# Patient Record
Sex: FEMALE | Race: WHITE | NOT HISPANIC OR LATINO | Employment: OTHER | URBAN - METROPOLITAN AREA
[De-identification: names, ages, dates, MRNs, and addresses within clinical notes are randomized per-mention and may not be internally consistent; named-entity substitution may affect disease eponyms.]

---

## 2021-05-10 ENCOUNTER — TELEPHONE (OUTPATIENT)
Dept: OTHER | Facility: CLINIC | Age: 71
End: 2021-05-10

## 2021-05-10 NOTE — TELEPHONE ENCOUNTER
Patient was incorrectly scheduled to discuss her arterial concerns with a physician at our [facility].  I contacted patient to explain.  Patient had a left leg bypass, it occluded, she had a thrombectomy and the graft was noted to be occluded at discharge s/p thrombectomy.  Patient would like to see another vascular surgeon as she would like to know what her options are at this point.  Spoke with [facility] radiology department who asked that I fax over a cover sheet with my request to have angio images from 03/08/21 and 04/10/21 pushed to [facility].  I have also contacted [facility] to make sure they are aware images are being pushed.  Patient agreed to 05/12/21 appointment scheduled with Dr. Holly at our [facility] and the address.  I did speak with a vascular nurse before scheduling patient with Dr. Holly.  I will route for vascular nurse review and to verify that angio images are in the PACS system for the upcoming appointment.

## 2021-05-11 NOTE — TELEPHONE ENCOUNTER
Imaging in PACS, notes viewable in Care Everywhere.    Rabia Vo, BSN, RN-[facility] [place]

## 2021-05-12 ENCOUNTER — OFFICE VISIT (OUTPATIENT)
Dept: OTHER | Facility: CLINIC | Age: 71
End: 2021-05-12
Attending: SURGERY
Payer: MEDICARE

## 2021-05-12 VITALS — SYSTOLIC BLOOD PRESSURE: 126 MMHG | TEMPERATURE: 96.2 F | DIASTOLIC BLOOD PRESSURE: 77 MMHG | HEART RATE: 87 BPM

## 2021-05-12 DIAGNOSIS — T82.868A THROMBOSIS OF FEMORO-POPLITEAL BYPASS GRAFT, INITIAL ENCOUNTER (H): Primary | ICD-10-CM

## 2021-05-12 PROCEDURE — G0463 HOSPITAL OUTPT CLINIC VISIT: HCPCS

## 2021-05-12 PROCEDURE — 99204 OFFICE O/P NEW MOD 45 MIN: CPT | Performed by: SURGERY

## 2021-05-12 RX ORDER — LEVOCETIRIZINE DIHYDROCHLORIDE 5 MG/1
5 TABLET, FILM COATED ORAL
COMMUNITY
End: 2024-03-26

## 2021-05-12 RX ORDER — DOXYCYCLINE 100 MG/1
100 CAPSULE ORAL
COMMUNITY
Start: 2021-05-06 | End: 2021-05-13

## 2021-05-12 RX ORDER — SOLIFENACIN SUCCINATE 10 MG/1
10 TABLET, FILM COATED ORAL DAILY
COMMUNITY
Start: 2021-05-07

## 2021-05-12 RX ORDER — LEVOTHYROXINE SODIUM 25 UG/1
25 TABLET ORAL DAILY
COMMUNITY
Start: 2021-04-26

## 2021-05-12 RX ORDER — ACETAMINOPHEN 500 MG
500-1000 TABLET ORAL EVERY 6 HOURS PRN
COMMUNITY
Start: 2020-01-28

## 2021-05-12 RX ORDER — WARFARIN SODIUM 5 MG/1
5 TABLET ORAL
COMMUNITY
Start: 2021-04-16 | End: 2022-04-16

## 2021-05-12 RX ORDER — WARFARIN SODIUM 2.5 MG/1
1.25-5 TABLET ORAL
COMMUNITY
Start: 2021-04-23 | End: 2022-04-23

## 2021-05-12 RX ORDER — AMOXICILLIN AND CLAVULANATE POTASSIUM 500; 125 MG/1; MG/1
1 TABLET, FILM COATED ORAL
COMMUNITY
Start: 2021-05-06 | End: 2021-05-13

## 2021-05-12 SDOH — HEALTH STABILITY: MENTAL HEALTH: HOW OFTEN DO YOU HAVE 6 OR MORE DRINKS ON ONE OCCASION?: NOT ASKED

## 2021-05-12 SDOH — HEALTH STABILITY: MENTAL HEALTH: HOW MANY STANDARD DRINKS CONTAINING ALCOHOL DO YOU HAVE ON A TYPICAL DAY?: NOT ASKED

## 2021-05-12 SDOH — HEALTH STABILITY: MENTAL HEALTH: HOW OFTEN DO YOU HAVE A DRINK CONTAINING ALCOHOL?: NOT ASKED

## 2021-05-12 NOTE — PROGRESS NOTES
Cheryl Ramesh is a 71 year old female who presents for:  Chief Complaint   Patient presents with     RECHECK     New consult to discuss occluded Left fem-pop bypass from [facility].  Patient looking for 2nd opinion and was given Dr. Holly's name.  Requested angio images from 03/08/21 and 04/09/21 be pushed to PACS. Other reports, office visits and op notes in Care Everywhere        Vitals:    Vitals:    05/12/21 0930 05/12/21 0932   BP: 136/77 126/77   BP Location: Left arm Right arm   Patient Position: Chair Chair   Cuff Size: Adult Regular Adult Regular   Pulse: 88 87   Temp: 96.2  F (35.7  C)    TempSrc: Temporal        BMI:  There is no height or weight on file to calculate BMI.    Pain Score:  Data Unavailable        Adriana Damian MA

## 2021-05-13 ASSESSMENT — ENCOUNTER SYMPTOMS
POOR WOUND HEALING: 1
BRUISES/BLEEDS EASILY: 1
NEUROLOGICAL NEGATIVE: 1
CONSTITUTIONAL NEGATIVE: 1
RESPIRATORY NEGATIVE: 1
PSYCHIATRIC NEGATIVE: 1
EYES NEGATIVE: 1
GASTROINTESTINAL NEGATIVE: 1
ENDOCRINE NEGATIVE: 1

## 2021-05-13 NOTE — PROGRESS NOTES
[facility] VASCULAR [facility] CENTER INITIAL VASCULAR SURGERY CONSULT    Impression:   1.  Status post a mid left SFA to below-knee popliteal bypass with translocated, nonreversed greater saphenous vein performed at an outside institution on 3/31/2021 for claudication.  Status post thrombectomy of this left leg bypass with revision of the distal anastomosis using a vein patch angioplasty and vein patch angioplasty of the mid graft stenosis also performed at an outside institution on 4/8/2021.  She was placed on empiric Coumadin but unfortunately this graft is again occluded.  She has been treated for a soft tissue infection of the mid left thigh.  She still complains of significant thigh pain and difficulty ambulating.  Post left leg graft thrombosis YVON is now 0.31 compared to 0.50 preoperatively.  She may be suffering from borderline rest pain.    She does have a 4 x 2 x 7 cm loculated hyperdense fluid collection in the medial aspect of the distal left thigh.  This was felt to represent either postoperative hematoma versus abscess.  Attempts at ultrasound-guided aspiration on 5/7/2021 were unsuccessful.    2.  Longstanding history of tobacco abuse having just quit smoking on 3/31/2021.    3.  Dyslipidemia    Plan:   I had a lengthy discussion with Cheryl regarding all the above.  I reinforced to her that based on available records and imaging her prior left leg bypass was appropriate.  Unfortunately, graft thrombectomy and revision have subsequently failed despite empiric Coumadin.  She had a wound of the left thigh which has required packing although this wound has largely healed.  She previously was on Augmentin for presumed soft tissue infection.    She has reasonable left-sided pedal Doppler signals and her post procedure resting YVON is 0.31.  She may have borderline left leg rest pain.  She will ultimately need a redo left leg bypass.  This would require either harvesting of the contralateral greater  saphenous vein or utilization of a cadaveric graft.  Given the recent soft tissue infection and postop changes, delaying this bypass would be in her best interest.  I note that she had recent unsuccessful ultrasound-guided attempt at aspiration of a distal left thigh hematoma.  Ideally, if this fluid is indeed infected it should be definitively drained.    She should be able to discontinue the empiric warfarin, but I defer this decision to her vascular surgeon.   I stressed the importance of absolute tobacco cessation and congratulated her on the ability to quit smoking thus far.  I also note that her LDL is a bit elevated and recommended that she follow-up for a repeat lipid profile with her primary care physician in 3 months.  She will continue her follow-up with her vascular surgeon.  The residual fluid collection in the distal left thigh may still need to be definitively drained.  Follow-up with me will be on an as-needed basis.      HPI:   Cheryl Ramesh is a 71-year-old female with dyslipidemia and prior tobacco abuse who underwent a mid left SFA to below-knee popliteal bypass using translocated, nonreverse greater saphenous vein performed at an outside institution on 3/31/2021.  Preoperatively her left-sided YVON was 0.5 decreasing to 0.10 post exercise.  Her surgery was uneventful.  A Prevena wound VAC was placed on her left groin.  When she return for removal of this device 1 week later the graft was noted to be thrombosed.  She was taken back to surgery on 4/8/2021 for graft thrombectomy with vein patch angioplasty of the mid graft and revision of the distal anastomosis using a vein patch.  She was started on empiric Coumadin.  Unfortunately that graft has failed.  She presents to me today for a second opinion regarding her left leg.    She had an open, draining wound which was managed with local care.  She was placed on empiric Augmentin for cellulitic changes of the left thigh.  She complains of  persistent pain in her left thigh.  She has limited her ambulation since surgery due to this discomfort.  She is unable to ambulate more than 1 block.  She notes intermittent pain in her left foot at rest.  She is in capable of standing any significant time.  She is sleeping in a recliner with her left leg in a dependent position.  Post graft thrombosis left leg YVON is now 0.31 compared to 0.50 preoperatively.    She is a retired individual who still enjoys gardening, golf, and travel.  She performs her own housework.  She has a significant tobacco history and just quit smoking on 3/31/2021.      CURRENT MEDICATIONS  acetaminophen (TYLENOL) 500 MG tablet, Take 500-1,000 mg by mouth  amoxicillin-clavulanate (AUGMENTIN) 500-125 MG tablet, Take 1 tablet by mouth  aspirin (ASA) 81 MG EC tablet, Take 81 mg by mouth  doxycycline monohydrate (MONODOX) 100 MG capsule, Take 100 mg by mouth  levocetirizine (XYZAL) 5 MG tablet, Take 5 mg by mouth  levothyroxine (SYNTHROID/LEVOTHROID) 25 MCG tablet, Take 25 mcg by mouth  rosuvastatin (CRESTOR) 10 MG tablet,   solifenacin (VESICARE) 10 MG tablet, Take 10 mg by mouth  warfarin ANTICOAGULANT (COUMADIN) 2.5 MG tablet, Take 1.25-5 mg by mouth  warfarin ANTICOAGULANT (COUMADIN) 5 MG tablet, Take 5 mg by mouth    No current facility-administered medications on file prior to visit.         PAST MEDICAL HISTORY  History reviewed. No pertinent past medical history.      PAST SURGICAL HISTORY:  History reviewed. No pertinent surgical history.    ALLERGIES     Allergies   Allergen Reactions     Azithromycin Rash     Cheryl doesn't recall having a problem with Zithromax. She only recalls Sulfa as being an allergy.     Ciprofloxacin Rash     Sulfa Drugs Rash       FAMILY HISTORY  History reviewed. No pertinent family history.    SOCIAL HISTORY  Social History     Tobacco Use     Smoking status: Former Smoker     Quit date: 3/31/2021     Years since quittin.1     Smokeless tobacco: Never  Used   Substance Use Topics     Alcohol use: Not Currently     Drug use: Never       ROS:   Review of Systems   Constitution: Negative.   HENT: Negative.    Eyes: Negative.    Cardiovascular: Positive for leg swelling.        See HPI.   Respiratory: Negative.    Endocrine: Negative.    Hematologic/Lymphatic: Bruises/bleeds easily.   Skin: Positive for poor wound healing.   Musculoskeletal: Positive for joint pain.   Gastrointestinal: Negative.    Genitourinary: Negative.    Neurological: Negative.    Psychiatric/Behavioral: Negative.          EXAM:  /77 (BP Location: Right arm, Patient Position: Chair, Cuff Size: Adult Regular)   Pulse 87   Temp 96.2  F (35.7  C) (Temporal)   Breastfeeding No   Physical Exam  Vitals signs reviewed.   Constitutional:       Appearance: Normal appearance.   HENT:      Head: Normocephalic and atraumatic.   Eyes:      General: No scleral icterus.     Extraocular Movements: Extraocular movements intact.      Pupils: Pupils are equal, round, and reactive to light.   Cardiovascular:      Rate and Rhythm: Normal rate and regular rhythm.      Pulses:           Radial pulses are 2+ on the right side and 2+ on the left side.        Femoral pulses are 2+ on the right side and 2+ on the left side.       Dorsalis pedis pulses are detected w/ Doppler on the right side and detected w/ Doppler on the left side.        Posterior tibial pulses are 2+ on the right side and detected w/ Doppler on the left side.      Comments: Surgical incisions on the medial left thigh and proximal medial left calf with normal postoperative changes and induration.  No significant erythema.  No open draining wounds.  No wounds on left foot.  Skin:     General: Skin is warm and dry.   Neurological:      General: No focal deficit present.      Mental Status: She is alert and oriented to person, place, and time. Mental status is at baseline.   Psychiatric:         Mood and Affect: Mood normal.         Behavior:  Behavior normal.         Thought Content: Thought content normal.         Judgment: Judgment normal.       Labs:  LIPID RESULTS:  No results found for: CHOL, HDL, LDL, TRIG, CHOLHDLRATIO    CBC RESULTS:  No results found for: WBC, RBC, HGB, HCT, MCV, MCH, MCHC, RDW, PLT    BMP RESULTS:  No results found for: NA, POTASSIUM, CHLORIDE, CO2, ANIONGAP, GLC, BUN, CR, GFRESTIMATED, GFRESTBLACK, KRISTY     A1C RESULTS:  No results found for: A1C      Imaging:    Normal ankle/brachial and digit/brachial pressure indices within the right   lower extremity.    Severely reduced ankle/brachial and digit/brachial pressure indices within   the left lower extremity.   Result Narrative   Indication: Peripheral vascular disease; assess graft patency    Thrombectomy of left femoral to popliteal artery bypass with vein patch at   mid thigh and distal anastomosis: 04/08/2021    A Doppler examination with ankle/brachial and digit/brachial pressure   indices was performed on the bilateral lower extremities.    RIGHT LOWER EXTREMITY    There are biphasic Doppler waveforms present within the posterior tibial   and dorsalis pedis arteries.    The right ankle/brachial pressure index is normal at 1.04.    The right digit/brachial pressure index is normal at 0.62.    LEFT LOWER EXTREMITY    There are reduced monophasic Doppler waveforms present within the   posterior tibial and dorsalis pedis arteries.    The left ankle/brachial pressure index is severely reduced at 0.31.    The left digit/brachial pressure index is severely reduced at 0.13.         Total length of this encounter was 45 minutes.          Dontrell Holly MD

## 2022-03-16 ENCOUNTER — TELEPHONE (OUTPATIENT)
Dept: OTHER | Facility: CLINIC | Age: 72
End: 2022-03-16
Payer: MEDICARE

## 2022-03-16 DIAGNOSIS — T82.868A THROMBOSIS OF FEMORO-POPLITEAL BYPASS GRAFT, INITIAL ENCOUNTER (H): Primary | ICD-10-CM

## 2022-03-16 NOTE — TELEPHONE ENCOUNTER
Previous patient of Dr. Holly    Patient called given quickly deteriorating knee condition. Had two unsuccessful bypass procedures previously in lower left leg that she consulted with Dr. Avni jason.    Biggest current need is questions on wether or not a new surgery on knee would be successful.    Patient has recent imaging with [facility] vascular center:    [place] (unspecified type on lower left leg) 1/3/22    [place] is preferred visit location    Please call [phone] to leave messages    Stephen Worley I   [facility]  Vascular [facility]   [address] [place] [address] [address] [place] [id number]  [phone]

## 2022-03-18 NOTE — TELEPHONE ENCOUNTER
Scheduled for 3/29/22 and 4/5/22.     Future Appointments   Date Time Provider Department Center   3/29/2022  2:45 PM VUS2 [facility]   4/5/2022  1:00 PM Dontrell Holly MD [facility]         Gela Martínez    [facility]   [phone]

## 2022-03-18 NOTE — TELEPHONE ENCOUNTER
Returned call to patient.  She states she no longer wants to follow with [facility] for her vascular care and is requesting to see Dr. Holly again.    LOV 5/12/21 with Dr. Holly.    Informed patient she will need a repeat YVON and in clinic visit with Dr. Holly.  Pt stated she wanted to check with her insurance company re: coverage, however would like to proceed with scheduling.    Order entered.  Routing to scheduling to contact patient to coordinate.  Please schedule as a return patient with Dr. Holly for 40 minutes.  Appt note in order.    Rabia Vo, KEVANN, RN-[facility] [place]

## 2022-03-29 ENCOUNTER — HOSPITAL ENCOUNTER (OUTPATIENT)
Dept: ULTRASOUND IMAGING | Facility: CLINIC | Age: 72
Discharge: HOME | End: 2022-03-29
Attending: SURGERY | Admitting: SURGERY
Payer: MEDICARE

## 2022-03-29 DIAGNOSIS — T82.868A THROMBOSIS OF FEMORO-POPLITEAL BYPASS GRAFT, INITIAL ENCOUNTER (H): ICD-10-CM

## 2022-03-29 PROCEDURE — 93922 UPR/L XTREMITY ART 2 LEVELS: CPT

## 2022-04-05 ENCOUNTER — OFFICE VISIT (OUTPATIENT)
Dept: OTHER | Facility: CLINIC | Age: 72
End: 2022-04-05
Attending: SURGERY
Payer: MEDICARE

## 2022-04-05 VITALS — DIASTOLIC BLOOD PRESSURE: 81 MMHG | SYSTOLIC BLOOD PRESSURE: 137 MMHG | HEART RATE: 92 BPM

## 2022-04-05 DIAGNOSIS — Z72.0 TOBACCO ABUSE: ICD-10-CM

## 2022-04-05 DIAGNOSIS — T82.868A THROMBOSIS OF FEMORO-POPLITEAL BYPASS GRAFT, INITIAL ENCOUNTER (H): Primary | ICD-10-CM

## 2022-04-05 PROCEDURE — 99214 OFFICE O/P EST MOD 30 MIN: CPT | Performed by: SURGERY

## 2022-04-05 PROCEDURE — G0463 HOSPITAL OUTPT CLINIC VISIT: HCPCS

## 2022-04-05 RX ORDER — CEPHALEXIN 500 MG/1
CAPSULE ORAL
COMMUNITY
Start: 2022-03-13

## 2022-04-05 NOTE — PROGRESS NOTES
[facility]        Patient is here for a  follow up.      Pt is currently taking Aspirin and Statin.    /81 (BP Location: Left arm, Patient Position: Chair, Cuff Size: Adult Regular)   Pulse 92   Breastfeeding No     The provider has been notified that the patient has no concerns.     Questions patient would like addressed today are: N/A.    Refills are needed: N/A    Has homecare services and agency name:  Gisela Damian MA

## 2022-04-05 NOTE — PROGRESS NOTES
Cheryl Ramesh is a 72-year-old female with dyslipidemia and prior tobacco abuse who is status post a failed mid left SFA to below-knee popliteal bypass using translocated, nonreversed greater saphenous vein performed at an outside institution on 3/31/2021.  Subsequent attempts at graft revision and empiric Coumadin were also unsuccessful.  She presented to me on 5/12/2021 for a second opinion as to right leg revascularization options.  Please see my detailed consultation from that 5/12/2021 office visit.  She has subsequently been seen by vascular surgeons at [facility], [facility], and a different vascular surgeon at [facility].    She is once again returning to me for vascular follow-up after an YVON at rest on 3/29/2022.  She is currently undergoing evaluation for a left knee replacement surgery.  She still complains of intermittent tenderness on the medial aspect of her distal left thigh which corresponds with a prior loculated 4 x 2 x 7 cm fluid collection.  She denies associated fever or chills.  When I last saw her in May of last year she had successfully quit smoking.  She subsequently resumed smoking and admits to smoking 8-10 cigarettes/day.  She is also fairly debilitated by plantar fasciitis.  Her ambulation is far more limited by her chronic knee pain and her plantar fasciitis then by obvious left calf claudication.  She reports the ability to walk 3 minutes on her treadmill before having to stop secondary to left knee and foot pain.  She denies rest pain.  She denies nonhealing wounds on her left foot.    Exam:  Pleasant, overweight female alert and oriented x3.  Blood pressure 137/81 with a pulse of 92.  Vague fullness and induration on the medial distal left thigh at the site that corresponds with a previously documented loculated hyperdense fluid collection.  The overlying skin is intact and without erythema or warmth.  Palpable right DP/PT pulses.  Monophasic left DP/PT Doppler  signals.  Both feet are warm and pink.  No open wounds.    Imaging:  US YVON DOPPLER NO EXERCISE, 1-2 LEVELS, BILAT  3/29/2022 3:17 PM     CLINICAL HISTORY: Peripheral arterial disease.  history of failed left  lower extremity bypass; evaluate with TBIs; Thrombosis of  femoro-popliteal bypass graft, initial encounter (H)     COMPARISON: None.     YVON FINDINGS:      RIGHT(mmHg)  Brachial: 141  Ankle (PT): 126; Index 0.80  Ankle (DP): 128; Index 0.81  Digit: 60; Index 0.38     LEFT (mmHg)  Brachial: 158  Ankle (PT): 50; Index 0.32  Ankle (DP): 57; Index 0.36  Digit: 23; Index 1.15                                                                      IMPRESSION:  1. RIGHT LOWER EXTREMITY: YVON resting is mildly diminished at 0.81.  The distal pressure is 60 mmHg which suggests adequate wound healing  potential. There are brisk normal triphasic waveforms throughout the  right lower extremity arteries. Overall the features would be  compatible with mild ischemia at rest however are not suggestive of a  hemodynamically significant lesion.     2. LEFT LOWER EXTREMITY: YVON resting his severely critically  diminished at 0.36. The digital pressure was 23 mmHg which suggest  poor wound healing potential. Monophasic waveforms within the  popliteal, posterior tibial and dorsalis pedis arteries. Overall the  features are suggestive of severe to critical ischemia at rest and  would be compatible with the provided indication of a thrombosed  femoral-popliteal arterial bypass.     SERGIO GROVES MD      ASSESSMENT:  1.  1 year status post failed mid left SFA to below-knee popliteal bypass with translocated, nonreversed greater saphenous vein.  Status post attempted graft thrombectomy with revision of the distal anastomosis.  Both of these procedures were performed at an outside institution.  She remains a stable short distance claudicator with a left-sided YVON of 0.36 which is essentially unchanged compared to her preoperative  baseline.  She is far more limited by osteoarthritis of her left knee and plantar fasciitis then she is by claudication.  She does not have evidence of critical limb ischemia.    2.  Ongoing tobacco abuse.    RECOMMENDATION:  I again reviewed all the above with Cheryl.  I stressed the importance of absolute tobacco cessation.  She has inline flow via her left superficial femoral artery to the level of the distal thigh as well as a reasonable quality left profundofemoral artery.  I believe she has adequate perfusion to pursue a left knee replacement without any further arterial intervention.    I discussed the specifics of a third time attempt at left leg revascularization.  I would not pursue this for claudication alone nor would I be willing to pursue this if she continues to smoke.  Once she has successful knee replacement surgery and has recovered from plantar fasciitis, I strongly recommend participation in a supervised exercise program with complete tobacco cessation.    I will be happy to see her for future follow-up in 6-12 months for repeat YVON with exercise.  All of her questions were answered and she verbalizes full understanding to the above and complete agreement with this management plan.    Total length of this encounter was 30 minutes.    Richi Holly MD

## 2022-04-12 DIAGNOSIS — T82.868A THROMBOSIS OF FEMORO-POPLITEAL BYPASS GRAFT, INITIAL ENCOUNTER (H): Primary | ICD-10-CM

## 2023-04-19 ENCOUNTER — HOSPITAL ENCOUNTER (OUTPATIENT)
Dept: ULTRASOUND IMAGING | Facility: CLINIC | Age: 73
Discharge: HOME | End: 2023-04-19
Attending: SURGERY
Payer: COMMERCIAL

## 2023-04-19 ENCOUNTER — OFFICE VISIT (OUTPATIENT)
Dept: OTHER | Facility: CLINIC | Age: 73
End: 2023-04-19
Attending: SURGERY
Payer: COMMERCIAL

## 2023-04-19 VITALS — DIASTOLIC BLOOD PRESSURE: 84 MMHG | HEART RATE: 96 BPM | SYSTOLIC BLOOD PRESSURE: 153 MMHG

## 2023-04-19 DIAGNOSIS — Z72.0 TOBACCO ABUSE: ICD-10-CM

## 2023-04-19 DIAGNOSIS — I73.9 PERIPHERAL VASCULAR DISEASE WITH CLAUDICATION (H): Primary | ICD-10-CM

## 2023-04-19 DIAGNOSIS — T82.868A THROMBOSIS OF FEMORO-POPLITEAL BYPASS GRAFT, INITIAL ENCOUNTER (H): ICD-10-CM

## 2023-04-19 PROCEDURE — 99213 OFFICE O/P EST LOW 20 MIN: CPT | Performed by: SURGERY

## 2023-04-19 PROCEDURE — G0463 HOSPITAL OUTPT CLINIC VISIT: HCPCS | Mod: 25

## 2023-04-19 PROCEDURE — 93924 LWR XTR VASC STDY BILAT: CPT

## 2023-04-19 NOTE — PROGRESS NOTES
[facility]        Patient is here for a  follow up.     Pt is currently taking Aspirin and Statin.    BP (!) 153/84 (BP Location: Left arm, Patient Position: Chair, Cuff Size: Adult Regular)   Pulse 96     The provider has been notified that the patient has no concerns.     Questions patient would like addressed today are: N/A.    Refills are needed: N/A    Has homecare services and agency name:  Gisela Damian MA

## 2023-04-19 NOTE — PROGRESS NOTES
Cheryl Ramesh is a 73-year-old female with dyslipidemia and prior tobacco abuse who is status post a failed mid left SFA to below-knee popliteal bypass using translocated, nonreversed greater saphenous vein performed at an outside hospital in 2021.  Subsequent attempts at graft revision and empiric Coumadin were also unsuccessful.    Please see a consultation from her 5/12/2021 office visit regarding left leg revascularization options.  I saw her in follow-up last year for the same issues.  She remains fairly debilitated by osteoarthritis of her left knee and left foot plantar fasciitis.  She has not yet pursued left knee replacement.  She moved over the winter from a house to a condominium.  She cites the stress of moving as the primary reason that she has not pursued knee replacement.  Unfortunately she continues to smoke about 10 cigarettes/day.      Exam:  Pleasant, overweight female alert and oriented x3.  Blood pressure 153/84 with a pulse of 96.  1+ palpable right DP and PT pulses.  Monophasic left DP and PT pulses.  Bilateral feet are warm and pink.    Imaging:  IR YVON US YVON DOPPLER WITH EXERCISE BILATERAL   4/19/2023 1:24 PM      HISTORY: history of failed left femoral-popliteal bypass;  claudication; Thrombosis of femoro-popliteal bypass graft, initial  encounter (H)     COMPARISON: 3/29/2022     FINDINGS:  Right YVON:   DP: 1.00  PT: 0.98.     Left YVON:   DP: 0.42   PT: 0.50.     Right Digital brachial index: 0.68, 98 mmHg.  Left Digital Brachial index: 0.10, 14 mmHg     Waveforms: Multiphasic in the right distal tibial arteries. Monophasic  in the distal left tibial arteries.     Exercise: The patient walked on a treadmill for 5 minutes at 1.5 miles  per hour and at a 10% incline. The patient had posterior upper thigh  pain at 3 minutes 30 seconds into exercise.     Right exercise YVON: 0.80.  Left exercise YVON: 0.22                                                                      IMPRESSION:   1.  Moderate exercise induced right lower extremity arterial  insufficiency.  2. Resting YVON in the left lower extremity indicates moderate to  severe arterial insufficiency. Following exercise, there is severe  left lower extremity arterial insufficiency. Left digital brachial  index would indicate poor wound healing potential.     YVON CRITERIA:  >0.95 Normal  0.90 - 0.94 Mild  0.5 - 0.89 Moderate  0.2 - 0.49 Severe  <0.2 Critical     INEZ MODI MD       ASSESSMENT:  1.  2 years status post failed mid left SFA to below-knee popliteal bypass with translocated, nonreversed greater saphenous vein.  Status post graft thrombectomy with revision of the distal anastomosis.  Both of these procedures were performed at an outside institution.  She remains a stable short distance claudicator with a left-sided YVON of 0.5 which is essentially unchanged compared to her preoperative baseline.  She remains far more limited by her osteoarthritis of her left knee and plantar fasciitis of her left foot and she is by claudication.  Left-sided toe pressures do demonstrate poor wound healing potential.    2.  Ongoing tobacco abuse.    RECOMMENDATION:  I reviewed all the above with Cheryl.  I stressed the importance of absolute tobacco cessation.  She has inline flow via her left superficial femoral artery to the level of the distal thigh as well as reasonable quality left profundofemoral artery flow.  I believe she has adequate perfusion to pursue a left knee replacement without any further arterial intervention.    I discussed the specifics of a third time attempt at left leg revascularization.  I would not pursue this for claudication alone nor would I be willing to pursue this if she continues to smoke.  If she has successful knee replacement and has recovered from plantar fasciitis I strongly recommend participation in a supervised exercise program with complete tobacco cessation.    She will continue her medical regimen.  Vascular  surgical follow-up will be with me in 1 year for a repeat YVON with exercise.  She verbalizes full understanding to the above and agreement with this management plan.    Total length of this encounter was 20 minutes with time spent reviewing studies, interviewing and examining the patient, answering questions, and formulating a treatment plan.    Richi Holly MD

## 2023-06-30 ENCOUNTER — TELEPHONE (OUTPATIENT)
Dept: OTHER | Facility: CLINIC | Age: 73
End: 2023-06-30
Payer: COMMERCIAL

## 2023-06-30 NOTE — TELEPHONE ENCOUNTER
Left voicemail with instructions for patient to call back to schedule their appointment(s)    June 30, 2023 , 3:58 PM

## 2023-06-30 NOTE — TELEPHONE ENCOUNTER
Review of Care Everywhere notes patient was seen at an urgent care on 6/21/23.  Noted to have venous stasis dermatitis of both lower extremities.      This is not a diagnosis Dr. Holly sees.  However, please offer an appointment with any Vascular medicine provider at next available.  Dr. Spivey has an opening for a new patient on 7/3/23.    Appt note:  Self referred for leg swelling, and venous stasis dermatitis of bilateral lower extremities; follows with Dr. Holly for PAD and failed left leg bypass graft; notes in Epic/Care Everywhere.    Rabia Vo, KEVANN, RN-[facility] [place]

## 2023-06-30 NOTE — TELEPHONE ENCOUNTER
[facility]    Who is the name of the provider?:  Avni    What is the location you see this provider at/preferred location?: [place]  Person calling / Facility: Cheryl Domitila  Phone number:  [phone]  Nurse call back needed:  YES     Reason for call:  Avni patient called and described recent pronounced swelling, numbness, tingling, skin irritation / rash on both legs. Rash was treated, swelling and other concerns are persisting. Asking for help on imaging request from her PCP. Please call her back.    Pharmacy location:  n/a  Outside Imaging: n/a   Can we leave a detailed message on this number?  YES

## 2023-07-03 ENCOUNTER — OFFICE VISIT (OUTPATIENT)
Dept: OTHER | Facility: CLINIC | Age: 73
End: 2023-07-03
Attending: INTERNAL MEDICINE
Payer: COMMERCIAL

## 2023-07-03 VITALS
WEIGHT: 183.8 LBS | OXYGEN SATURATION: 95 % | BODY MASS INDEX: 31.38 KG/M2 | HEIGHT: 64 IN | HEART RATE: 93 BPM | SYSTOLIC BLOOD PRESSURE: 125 MMHG | DIASTOLIC BLOOD PRESSURE: 79 MMHG

## 2023-07-03 DIAGNOSIS — M79.89 LEG SWELLING: Primary | ICD-10-CM

## 2023-07-03 PROCEDURE — 99205 OFFICE O/P NEW HI 60 MIN: CPT | Performed by: INTERNAL MEDICINE

## 2023-07-03 PROCEDURE — G0463 HOSPITAL OUTPT CLINIC VISIT: HCPCS

## 2023-07-03 RX ORDER — MULTIPLE VITAMINS W/ MINERALS TAB 9MG-400MCG
1 TAB ORAL DAILY
COMMUNITY
End: 2024-03-26

## 2023-07-03 NOTE — PROGRESS NOTES
"Patient is here to discuss consult    /75 (BP Location: Right arm, Patient Position: Chair, Cuff Size: Adult Regular)   Pulse 96   Ht 5' 4\" (1.626 m)   Wt 183 lb 12.8 oz (83.4 kg)   SpO2 95%   BMI 31.55 kg/m      Questions patient would like addressed today are: N/A.    Refills are needed: No    Has homecare services and agency name:  Gisela CASSIDY    "

## 2023-07-03 NOTE — PROGRESS NOTES
INITIAL VASCULAR MEDICAL ASSESSMENT  REFERRAL SOURCE: self  REASON FOR CONSULT: venous insufficiency    HPI: Cheryl Ramesh is an obese (BMI 31.55) 73 year old female with dyslipidemia, prior tobacco abuse, as well as a h/o PAD for which she is status post a failed mid left SFA to below-knee popliteal bypass using translocated, nonreversed greater saphenous vein performed at an outside hospital in 2021.  Subsequent attempts at graft revision and empiric Coumadin were also unsuccessful. She is a short disstance claudicator without CLI sxs or tissue loss. She is orthopaedically limited by knee OA, as well as bilateral plantar fasciitis and has plans to eventually undergo a left TKA. She notes that since approximately 6/7/23 she has developed a bilateral LE rash which has since dissipated and BLE swelling which has not dissipated. She was seen in an UC, was told she had venous stasis dermatitis, and told to wear compression hosiery. She has had no imaging pertaining to this.     Review Of Systems  Skin: as above  Eyes: negative  Ears/Nose/Throat: negative  Respiratory: No shortness of breath, dyspnea on exertion, cough, or hemoptysis  Cardiovascular: negative  Gastrointestinal: negative  Genitourinary: negative  Musculoskeletal: as above  Neurologic: negative  Psychiatric: negative  Hematologic/Lymphatic/Immunologic: negative  Endocrine: negative      PAST MEDICAL HISTORY:                No past medical history on file.    PAST SURGICAL HISTORY:                  Past Surgical History:   Procedure Laterality Date     IR LOWER EXTREMITY ANGIOGRAM LEFT  4/9/2021     IR LOWER EXTREMITY ANGIOGRAM LEFT  3/8/2021       CURRENT MEDICATIONS:                  Current Outpatient Medications   Medication Sig Dispense Refill     acetaminophen (TYLENOL) 500 MG tablet Take 500-1,000 mg by mouth       aspirin (ASA) 81 MG EC tablet Take 81 mg by mouth       cephALEXin (KEFLEX) 500 MG capsule TAKE 1 CAPSULE BY MOUTH EVERY DAY        levocetirizine (XYZAL) 5 MG tablet Take 5 mg by mouth (Patient not taking: Reported on 2022)       levothyroxine (SYNTHROID/LEVOTHROID) 25 MCG tablet Take 25 mcg by mouth       rosuvastatin (CRESTOR) 10 MG tablet 20 mg        solifenacin (VESICARE) 10 MG tablet Take 10 mg by mouth         ALLERGIES:                  Allergies   Allergen Reactions     Azithromycin Rash     Cheryl doesn't recall having a problem with Zithromax. She only recalls Sulfa as being an allergy.     Ciprofloxacin Rash     Sulfa Antibiotics Rash       SOCIAL HISTORY:                  Social History     Socioeconomic History     Marital status: Single     Spouse name: Not on file     Number of children: Not on file     Years of education: Not on file     Highest education level: Not on file   Occupational History     Not on file   Tobacco Use     Smoking status: Former     Types: Cigarettes     Quit date: 3/31/2021     Years since quittin.2     Smokeless tobacco: Never   Substance and Sexual Activity     Alcohol use: Not Currently     Drug use: Never     Sexual activity: Not on file   Other Topics Concern     Not on file   Social History Narrative     Not on file     Social Determinants of Health     Financial Resource Strain: Not on file   Food Insecurity: Not on file   Transportation Needs: Not on file   Physical Activity: Not on file   Stress: Not on file   Social Connections: Not on file   Intimate Partner Violence: Not on file   Housing Stability: Not on file       FAMILY HISTORY:                 No family history on file.      Physical exam Reveals:    O/P: WNL  HEENT: WNL  NECK: No JVD, thyromegaly, or lymphadenopathy  HEART: RRR, no murmurs, gallops, or rubs  LUNGS: CTA bilaterally without rales, wheezes, or rhonchi  GI: NABS, nondistended, nontender, soft  EXT:without cyanosis, clubbing; 2 plus bilateral symmetrical LE edema, CEAP C3 venous insufficiency bialterally; Stemmer sign negative bialterally.  NEURO: nonfocal  : no  flank tenderness                IR YVON US YVON DOPPLER WITH EXERCISE BILATERAL   4/19/2023 1:24 PM      HISTORY: history of failed left femoral-popliteal bypass;  claudication; Thrombosis of femoro-popliteal bypass graft, initial  encounter (H)     COMPARISON: 3/29/2022     FINDINGS:  Right YVON:   DP: 1.00  PT: 0.98.     Left YVON:   DP: 0.42   PT: 0.50.     Right Digital brachial index: 0.68, 98 mmHg.  Left Digital Brachial index: 0.10, 14 mmHg     Waveforms: Multiphasic in the right distal tibial arteries. Monophasic  in the distal left tibial arteries.     Exercise: The patient walked on a treadmill for 5 minutes at 1.5 miles  per hour and at a 10% incline. The patient had posterior upper thigh  pain at 3 minutes 30 seconds into exercise.     Right exercise YVON: 0.80.  Left exercise YVON: 0.22                                                                      IMPRESSION:   1. Moderate exercise induced right lower extremity arterial  insufficiency.  2. Resting YVON in the left lower extremity indicates moderate to  severe arterial insufficiency. Following exercise, there is severe  left lower extremity arterial insufficiency. Left digital brachial  index would indicate poor wound healing potential.     YVON CRITERIA:  >0.95 Normal  0.90 - 0.94 Mild  0.5 - 0.89 Moderate  0.2 - 0.49 Severe  <0.2 Critical     INEZ MODI MD          A/P:      (M79.89) Leg swelling  (primary encounter diagnosis)  Comment: Her rash has improved . Her edema has not. She has findings suggestive of venous insufficiency bilaterally to the above knee location. While we can not for certain that she does not have a DVT without imaging, the fact that her rash has dissipated rather than gotten worse, and the fact that he swelling is symmetrical and not progressive is reassuring for the absence of a DVT. She is told to call us for progressive swelling sxs in which case I would order a venus comp study. She is given an Rx for thigh high and  waist high 20-30 mm Hg compression hosiery. Her bypass graft has already failed so I am unconcerned regarding her ability to tolerate compression from n arterial perspective. I advised her of the need to control edema prior to Lt TKA. She will wear compression. RTC 3 to 6 months S/P wearing compression if dissatisfied with sx control in which case we will need to undertake a venous comp study.  Plan: Compression Sleeve/Stocking Order for DME -                 62 minutes total medical care on today's date.

## 2023-07-13 ENCOUNTER — TELEPHONE (OUTPATIENT)
Dept: OTHER | Facility: CLINIC | Age: 73
End: 2023-07-13
Payer: COMMERCIAL

## 2023-07-13 NOTE — TELEPHONE ENCOUNTER
[facility]    Who is the name of the provider?:  Dr Spivey  What is the location you see this provider at/preferred location?: [place]  Person calling / Facility: Cheryl  Phone number:  [phone]  Nurse call back needed:  Yes please , if questions or when done please    Reason for call:  Cheryl called today to have compression socks prescription sent to insurance company then they will pay for them or help cover      Pharmacy location:  chart  Outside Imaging: no   Can we leave a detailed message on this number?  yes

## 2023-07-13 NOTE — TELEPHONE ENCOUNTER
"Called Cheryl and asked for a fax number to which she would like her prescription sent.    She was unable to provide one.  She is going to call [employer] and request that they submit for reimbursement.  Apparently they declined to do so before because \"they are never covered\".  The patient states that her insurance company gave her some codes and told her they are covered.    She will call back if she needs additional assistance.  "

## 2024-01-17 ENCOUNTER — TELEPHONE (OUTPATIENT)
Dept: OTHER | Facility: CLINIC | Age: 74
End: 2024-01-17
Payer: COMMERCIAL

## 2024-01-17 NOTE — TELEPHONE ENCOUNTER
[facility]    Who is the name of the provider?:  DONTRELL HOLLY   What is the location you see this provider at/preferred location?: [place]  Person calling / Facility: Cheryl Ramesh  Phone number:  [phone]   Nurse call back needed:  ?     Reason for call:  Patient describes increasing pain, numbness and tingling in LLE. States that pain wakes her from sleep. Patient meant to be seen in April, scheduled her next week. FYI new symptoms in case any additional imaging needs to be added.    Future Appointments   Date Time Provider Department Center   1/24/2024  1:30 PM [facility]   1/24/2024  2:20 PM Dontrell Holly MD [facility]     Pharmacy location:     [facility] [id number] [place] [facility] - [address] [facility] [facility] [place], [place] - [address] [place] [address]  Outside Imaging: n/a   Can we leave a detailed message on this number?  YES     1/17/2024, 2:21 PM

## 2024-01-17 NOTE — TELEPHONE ENCOUNTER
Pt has history of failed mid left SFA to BK popliteal bypass with NRTSV; s/p revision of distal anastomosis with thrombectomy.  Both procedures done at outside hospital.    Duration? Feels like it has been increasing the last few months, however last week, pt wakes up at night with pain around left ankle; puts pillow under knee which seems to help; pt also notes it is painful to put the leg down and stand on the left leg, however once she starts walking it does start to feel better.  Temp? Equal  Color? Left ankle purple but feels this has gradually become worse over the last year.  Knee replacement? No, having shoulder replacement instead; this is not yet scheduled  Smoking?  Down to half pack  Wounds? Denies    Advised patient if her symptoms worsen over the next week to call us back.  Patient noted understanding.  Reviewed appointment date and times with patient and location for checkin.    Rabia Vo, KEVANN, RN, [facility] [place]

## 2024-01-24 ENCOUNTER — OFFICE VISIT (OUTPATIENT)
Dept: OTHER | Facility: CLINIC | Age: 74
End: 2024-01-24
Attending: SURGERY
Payer: COMMERCIAL

## 2024-01-24 ENCOUNTER — HOSPITAL ENCOUNTER (OUTPATIENT)
Dept: ULTRASOUND IMAGING | Facility: CLINIC | Age: 74
Discharge: HOME | End: 2024-01-24
Attending: SURGERY
Payer: COMMERCIAL

## 2024-01-24 VITALS — HEART RATE: 96 BPM | SYSTOLIC BLOOD PRESSURE: 134 MMHG | DIASTOLIC BLOOD PRESSURE: 83 MMHG

## 2024-01-24 DIAGNOSIS — Z72.0 TOBACCO ABUSE: ICD-10-CM

## 2024-01-24 DIAGNOSIS — I73.9 PERIPHERAL VASCULAR DISEASE WITH CLAUDICATION (H): ICD-10-CM

## 2024-01-24 DIAGNOSIS — I73.9 PERIPHERAL VASCULAR DISEASE WITH CLAUDICATION (H): Primary | ICD-10-CM

## 2024-01-24 PROCEDURE — G0463 HOSPITAL OUTPT CLINIC VISIT: HCPCS | Mod: 25 | Performed by: SURGERY

## 2024-01-24 PROCEDURE — 99213 OFFICE O/P EST LOW 20 MIN: CPT | Performed by: SURGERY

## 2024-01-24 PROCEDURE — 93924 LWR XTR VASC STDY BILAT: CPT

## 2024-01-24 RX ORDER — MULTIVITAMIN
1 TABLET ORAL DAILY
COMMUNITY

## 2024-01-24 RX ORDER — BUPROPION HYDROCHLORIDE 75 MG/1
75 TABLET ORAL EVERY MORNING
COMMUNITY
Start: 2023-12-29

## 2024-01-24 NOTE — PROGRESS NOTES
Cheryl Ramesh is a 73-year-old female with dyslipidemia and ongoing tobacco abuse who is status post a failed mid left SFA to below-knee popliteal bypass using translocated, nonreversed greater saphenous vein performed at an outside hospital in 2021.  Subsequent attempts at graft revision and empiric Coumadin were also unsuccessful.    She remains fairly debilitated by osteoarthritis of her left knee and left foot plantar fasciitis.  She still has not pursued left knee replacement but her left foot fasciitis has resolved.  She continues to cite stress is the reason for ongoing smoking.  She also complains of recent unilateral left calf and ankle edema.  Earlier this year she developed a left leg rash after a stressful time in her life.  Shortly thereafter she developed some left calf and ankle edema which has persisted for the past few months.    Exam:  Pleasant, overweight female alert and oriented x 3.  Blood pressure 134/83 with a pulse of 96.  1+ palpable right DP and PT pulses.  Monophasic left DP and PT pulses.  Both feet are warm and pink.  1+ pitting ankle edema bilaterally.  Her left calf appears slightly larger than the right.  Negative Homans' sign.    Imaging:  I reviewed today's YVON with exercise.  Her right leg is normal and decreases to 0.8 postexercise.  Resting left-sided YVON is 0.5 decreasing to 0.22 postexercise.  Her left leg response is unchanged compared to 1 year ago.    IMPRESSION:  1.  Left leg peripheral arterial disease with claudication status post failed left leg revisions x 2.  No critical limb ischemia.    2.  Unilateral left calf and ankle edema now for the past several months.  Clinically I am not concerned about DVT.  This may be related to the osteoarthritic changes of her left knee.    3.  Ongoing tobacco abuse.    PLAN:  I reviewed all the above with Cheryl.  I stressed the importance of absolute tobacco cessation both for her peripheral arterial disease as well as upcoming  orthopedic procedures.  She understands that I will not consider her for a third time left leg revascularization for claudication alone particularly if she continues to smoke.  She could consider a left leg compression stocking of 20-30 mmHg pressure for left calf edema control.  She already owns stockings but has a hard time putting them on due to her weak left shoulder.  She will continue her medical regimen which includes daily aspirin.  Vascular surgical follow-up will be with me in 1 year for a repeat YVON with exercise.    Total length of this encounter was 20 minutes with time spent reviewing studies, interviewing and examining the patient, answering questions, and formulating a treatment plan.

## 2024-01-24 NOTE — PROGRESS NOTES
[facility]        Patient is here for a  follow up  to discuss Peripheral artery disease (PAD).     Pt is currently taking Aspirin, Statin, and Antibiotics.    /83 (BP Location: Left arm, Patient Position: Chair, Cuff Size: Adult Regular)   Pulse 96     The provider has been notified that the patient has no concerns.     Questions patient would like addressed today are: N/A.    Refills are needed: N/A    Has homecare services and agency name:  Gisela Damian MA

## 2024-02-26 ENCOUNTER — MEDICAL CORRESPONDENCE (OUTPATIENT)
Dept: HEALTH INFORMATION MANAGEMENT | Facility: CLINIC | Age: 74
End: 2024-02-26

## 2024-03-15 ENCOUNTER — TRANSFERRED RECORDS (OUTPATIENT)
Dept: MULTI SPECIALTY CLINIC | Facility: CLINIC | Age: 74
End: 2024-03-15

## 2024-03-15 LAB
CREATININE (EXTERNAL): 0.75 MG/DL (ref 0.57–1)
GFR ESTIMATED (EXTERNAL): 83 ML/MIN/1.73
GLUCOSE (EXTERNAL): 96 MG/DL (ref 70–99)
POTASSIUM (EXTERNAL): 4.8 MMOL/L (ref 3.5–5.2)

## 2024-03-26 RX ORDER — AMMONIUM LACTATE 12 G/100G
CREAM TOPICAL DAILY PRN
COMMUNITY

## 2024-03-26 NOTE — PROGRESS NOTES
PTA medications updated by Medication Scribe prior to surgery via phone call with patient (last doses completed by Nurse)     Medication history sources: Patient, Surescripts, and H&P  In the past week, patient estimated taking medication this percent of the time: Greater than 90%      Significant changes made to the medication list:  Patient reports no longer taking the following meds (med scribe removed from PTA med list): Xyzal      Additional medication history information:   None    Medication reconciliation completed by provider prior to medication history? No    Time spent in this activity: 35 minutes    The information provided in this note is only as accurate as the sources available at the time of update(s)      Prior to Admission medications    Medication Sig Last Dose Taking? Auth Provider Long Term End Date   acetaminophen (TYLENOL) 500 MG tablet Take 500-1,000 mg by mouth every 6 hours as needed for pain Unknown at prn Yes Reported, Patient     ammonium lactate (AMLACTIN) 12 % external cream Apply topically daily as needed for dry skin Unknown at prn Yes Reported, Patient     aspirin (ASA) 81 MG EC tablet Take 81 mg by mouth 3/20/2024 at am Yes Reported, Patient     buPROPion (WELLBUTRIN) 75 MG tablet Take 75 mg by mouth every morning  at am Yes Reported, Patient Yes    Calcium Carb-Cholecalciferol (CALCIUM 500+D PO) Take 1 tablet by mouth daily 3/26/2024 at PM Yes Reported, Patient     cephALEXin (KEFLEX) 500 MG capsule TAKE 1 CAPSULE BY MOUTH EVERY DAY 3/26/2024 at am Yes Reported, Patient     levothyroxine (SYNTHROID/LEVOTHROID) 25 MCG tablet Take 25 mcg by mouth daily  at am Yes Reported, Patient Yes    Loratadine (CLARITIN PO) Take 10 mg by mouth daily  at am Yes Reported, Patient     multivitamin w/minerals (MULTI-VITAMIN) tablet Take 1 tablet by mouth daily 3/26/2024 at am Yes Reported, Patient     Rosuvastatin Calcium 40 MG CPSP Take 40 mg by mouth daily 3/26/2024 at am Yes Reported, Patient  Yes    solifenacin (VESICARE) 10 MG tablet Take 10 mg by mouth daily 3/26/2024 at am Yes Reported, Patient         Medication history completed by: Shagufta Sotomayor LPN

## 2024-03-27 ENCOUNTER — ANESTHESIA (OUTPATIENT)
Dept: SURGERY | Facility: CLINIC | Age: 74
End: 2024-03-27
Payer: COMMERCIAL

## 2024-03-27 ENCOUNTER — HOSPITAL ENCOUNTER (OUTPATIENT)
Facility: CLINIC | Age: 74
Discharge: HOME | End: 2024-03-28
Attending: ORTHOPAEDIC SURGERY | Admitting: ORTHOPAEDIC SURGERY
Payer: COMMERCIAL

## 2024-03-27 ENCOUNTER — APPOINTMENT (OUTPATIENT)
Dept: GENERAL RADIOLOGY | Facility: CLINIC | Age: 74
End: 2024-03-27
Attending: ORTHOPAEDIC SURGERY
Payer: COMMERCIAL

## 2024-03-27 ENCOUNTER — ANESTHESIA EVENT (OUTPATIENT)
Dept: SURGERY | Facility: CLINIC | Age: 74
End: 2024-03-27
Payer: COMMERCIAL

## 2024-03-27 DIAGNOSIS — M75.102 LEFT ROTATOR CUFF TEAR ARTHROPATHY: Primary | ICD-10-CM

## 2024-03-27 DIAGNOSIS — M12.812 LEFT ROTATOR CUFF TEAR ARTHROPATHY: Primary | ICD-10-CM

## 2024-03-27 PROCEDURE — 370N000017 HC ANESTHESIA TECHNICAL FEE, PER MIN: Performed by: ORTHOPAEDIC SURGERY

## 2024-03-27 PROCEDURE — 250N000025 HC SEVOFLURANE, PER MIN: Performed by: ORTHOPAEDIC SURGERY

## 2024-03-27 PROCEDURE — 258N000003 HC RX IP 258 OP 636: Performed by: ANESTHESIOLOGY

## 2024-03-27 PROCEDURE — 360N000077 HC SURGERY LEVEL 4, PER MIN: Performed by: ORTHOPAEDIC SURGERY

## 2024-03-27 PROCEDURE — 23472 RECONSTRUCT SHOULDER JOINT: CPT | Performed by: STUDENT IN AN ORGANIZED HEALTH CARE EDUCATION/TRAINING PROGRAM

## 2024-03-27 PROCEDURE — 258N000003 HC RX IP 258 OP 636: Performed by: NURSE ANESTHETIST, CERTIFIED REGISTERED

## 2024-03-27 PROCEDURE — 99100 ANES PT EXTEME AGE<1 YR&>70: CPT | Performed by: NURSE ANESTHETIST, CERTIFIED REGISTERED

## 2024-03-27 PROCEDURE — 23472 RECONSTRUCT SHOULDER JOINT: CPT | Performed by: NURSE ANESTHETIST, CERTIFIED REGISTERED

## 2024-03-27 PROCEDURE — 250N000011 HC RX IP 250 OP 636: Performed by: STUDENT IN AN ORGANIZED HEALTH CARE EDUCATION/TRAINING PROGRAM

## 2024-03-27 PROCEDURE — 272N000001 HC OR GENERAL SUPPLY STERILE: Performed by: ORTHOPAEDIC SURGERY

## 2024-03-27 PROCEDURE — 710N000009 HC RECOVERY PHASE 1, LEVEL 1, PER MIN: Performed by: ORTHOPAEDIC SURGERY

## 2024-03-27 PROCEDURE — C1776 JOINT DEVICE (IMPLANTABLE): HCPCS | Performed by: ORTHOPAEDIC SURGERY

## 2024-03-27 PROCEDURE — 258N000003 HC RX IP 258 OP 636: Performed by: ORTHOPAEDIC SURGERY

## 2024-03-27 PROCEDURE — 250N000009 HC RX 250: Performed by: PHYSICIAN ASSISTANT

## 2024-03-27 PROCEDURE — 999N000065 XR SHOULDER LEFT PORT G/E 2 VIEWS: Mod: LT

## 2024-03-27 PROCEDURE — 271N000001 HC OR GENERAL SUPPLY NON-STERILE: Performed by: ORTHOPAEDIC SURGERY

## 2024-03-27 PROCEDURE — C1713 ANCHOR/SCREW BN/BN,TIS/BN: HCPCS | Performed by: ORTHOPAEDIC SURGERY

## 2024-03-27 PROCEDURE — 250N000011 HC RX IP 250 OP 636: Performed by: NURSE ANESTHETIST, CERTIFIED REGISTERED

## 2024-03-27 PROCEDURE — 258N000001 HC RX 258: Performed by: ORTHOPAEDIC SURGERY

## 2024-03-27 PROCEDURE — 250N000011 HC RX IP 250 OP 636: Performed by: ORTHOPAEDIC SURGERY

## 2024-03-27 PROCEDURE — 250N000009 HC RX 250: Performed by: NURSE ANESTHETIST, CERTIFIED REGISTERED

## 2024-03-27 PROCEDURE — L3670 SO ACRO/CLAV CAN WEB PRE OTS: HCPCS

## 2024-03-27 PROCEDURE — 250N000011 HC RX IP 250 OP 636: Performed by: PHYSICIAN ASSISTANT

## 2024-03-27 PROCEDURE — 250N000013 HC RX MED GY IP 250 OP 250 PS 637: Performed by: ORTHOPAEDIC SURGERY

## 2024-03-27 PROCEDURE — 999N000141 HC STATISTIC PRE-PROCEDURE NURSING ASSESSMENT: Performed by: ORTHOPAEDIC SURGERY

## 2024-03-27 DEVICE — IMPLANTABLE DEVICE
Type: IMPLANTABLE DEVICE | Site: SHOULDER | Status: FUNCTIONAL
Brand: TORNIER FLEX SHOULDER SYSTEM

## 2024-03-27 DEVICE — IMPLANTABLE DEVICE
Type: IMPLANTABLE DEVICE | Site: SHOULDER | Status: FUNCTIONAL
Brand: AEQUALIS REVERSED II

## 2024-03-27 DEVICE — IMP SCR LOCKING 4.5X29MM AQLS DWD029: Type: IMPLANTABLE DEVICE | Site: SHOULDER | Status: FUNCTIONAL

## 2024-03-27 RX ORDER — HYDROMORPHONE HCL IN WATER/PF 6 MG/30 ML
0.4 PATIENT CONTROLLED ANALGESIA SYRINGE INTRAVENOUS
Status: DISCONTINUED | OUTPATIENT
Start: 2024-03-27 | End: 2024-03-28 | Stop reason: HOSPADM

## 2024-03-27 RX ORDER — LORATADINE 10 MG/1
10 TABLET ORAL DAILY
Status: DISCONTINUED | OUTPATIENT
Start: 2024-03-28 | End: 2024-03-28 | Stop reason: HOSPADM

## 2024-03-27 RX ORDER — HYDROMORPHONE HCL IN WATER/PF 6 MG/30 ML
0.4 PATIENT CONTROLLED ANALGESIA SYRINGE INTRAVENOUS EVERY 5 MIN PRN
Status: DISCONTINUED | OUTPATIENT
Start: 2024-03-27 | End: 2024-03-27 | Stop reason: HOSPADM

## 2024-03-27 RX ORDER — PROPOFOL 10 MG/ML
INJECTION, EMULSION INTRAVENOUS PRN
Status: DISCONTINUED | OUTPATIENT
Start: 2024-03-27 | End: 2024-03-27

## 2024-03-27 RX ORDER — ONDANSETRON 2 MG/ML
INJECTION INTRAMUSCULAR; INTRAVENOUS PRN
Status: DISCONTINUED | OUTPATIENT
Start: 2024-03-27 | End: 2024-03-27

## 2024-03-27 RX ORDER — KETAMINE HYDROCHLORIDE 10 MG/ML
INJECTION INTRAMUSCULAR; INTRAVENOUS PRN
Status: DISCONTINUED | OUTPATIENT
Start: 2024-03-27 | End: 2024-03-27

## 2024-03-27 RX ORDER — MULTIPLE VITAMINS W/ MINERALS TAB 9MG-400MCG
1 TAB ORAL DAILY
Status: DISCONTINUED | OUTPATIENT
Start: 2024-03-27 | End: 2024-03-27

## 2024-03-27 RX ORDER — CEFAZOLIN SODIUM/WATER 2 G/20 ML
2 SYRINGE (ML) INTRAVENOUS
Status: COMPLETED | OUTPATIENT
Start: 2024-03-27 | End: 2024-03-27

## 2024-03-27 RX ORDER — HYDROMORPHONE HCL IN WATER/PF 6 MG/30 ML
0.2 PATIENT CONTROLLED ANALGESIA SYRINGE INTRAVENOUS EVERY 5 MIN PRN
Status: DISCONTINUED | OUTPATIENT
Start: 2024-03-27 | End: 2024-03-27 | Stop reason: HOSPADM

## 2024-03-27 RX ORDER — PROPOFOL 10 MG/ML
INJECTION, EMULSION INTRAVENOUS CONTINUOUS PRN
Status: DISCONTINUED | OUTPATIENT
Start: 2024-03-27 | End: 2024-03-27

## 2024-03-27 RX ORDER — ONDANSETRON 2 MG/ML
4 INJECTION INTRAMUSCULAR; INTRAVENOUS EVERY 30 MIN PRN
Status: DISCONTINUED | OUTPATIENT
Start: 2024-03-27 | End: 2024-03-27 | Stop reason: HOSPADM

## 2024-03-27 RX ORDER — HYDROMORPHONE HCL IN WATER/PF 6 MG/30 ML
0.2 PATIENT CONTROLLED ANALGESIA SYRINGE INTRAVENOUS
Status: DISCONTINUED | OUTPATIENT
Start: 2024-03-27 | End: 2024-03-28 | Stop reason: HOSPADM

## 2024-03-27 RX ORDER — OXYCODONE HYDROCHLORIDE 5 MG/1
10 TABLET ORAL
Status: DISCONTINUED | OUTPATIENT
Start: 2024-03-27 | End: 2024-03-28 | Stop reason: HOSPADM

## 2024-03-27 RX ORDER — HYDROXYZINE HYDROCHLORIDE 10 MG/1
10 TABLET, FILM COATED ORAL EVERY 6 HOURS PRN
Qty: 30 TABLET | Refills: 0 | Status: SHIPPED | OUTPATIENT
Start: 2024-03-27 | End: 2024-03-28

## 2024-03-27 RX ORDER — OXYCODONE HYDROCHLORIDE 5 MG/1
5-10 TABLET ORAL EVERY 4 HOURS PRN
Qty: 30 TABLET | Refills: 0 | Status: SHIPPED | OUTPATIENT
Start: 2024-03-27

## 2024-03-27 RX ORDER — SODIUM CHLORIDE, SODIUM LACTATE, POTASSIUM CHLORIDE, CALCIUM CHLORIDE 600; 310; 30; 20 MG/100ML; MG/100ML; MG/100ML; MG/100ML
INJECTION, SOLUTION INTRAVENOUS CONTINUOUS
Status: DISCONTINUED | OUTPATIENT
Start: 2024-03-27 | End: 2024-03-27 | Stop reason: HOSPADM

## 2024-03-27 RX ORDER — HYDROMORPHONE HYDROCHLORIDE 1 MG/ML
INJECTION, SOLUTION INTRAMUSCULAR; INTRAVENOUS; SUBCUTANEOUS PRN
Status: DISCONTINUED | OUTPATIENT
Start: 2024-03-27 | End: 2024-03-27

## 2024-03-27 RX ORDER — TOLTERODINE 4 MG/1
4 CAPSULE, EXTENDED RELEASE ORAL DAILY
Status: DISCONTINUED | OUTPATIENT
Start: 2024-03-28 | End: 2024-03-28 | Stop reason: HOSPADM

## 2024-03-27 RX ORDER — ASPIRIN 81 MG/1
81 TABLET ORAL DAILY
Status: DISCONTINUED | OUTPATIENT
Start: 2024-03-27 | End: 2024-03-28 | Stop reason: HOSPADM

## 2024-03-27 RX ORDER — BUPROPION HYDROCHLORIDE 75 MG/1
75 TABLET ORAL EVERY MORNING
Status: DISCONTINUED | OUTPATIENT
Start: 2024-03-28 | End: 2024-03-28 | Stop reason: HOSPADM

## 2024-03-27 RX ORDER — HYDROXYZINE HYDROCHLORIDE 10 MG/1
10 TABLET, FILM COATED ORAL EVERY 6 HOURS PRN
Status: DISCONTINUED | OUTPATIENT
Start: 2024-03-27 | End: 2024-03-28 | Stop reason: HOSPADM

## 2024-03-27 RX ORDER — PROCHLORPERAZINE MALEATE 5 MG
5 TABLET ORAL EVERY 6 HOURS PRN
Status: DISCONTINUED | OUTPATIENT
Start: 2024-03-27 | End: 2024-03-28 | Stop reason: HOSPADM

## 2024-03-27 RX ORDER — LIDOCAINE HYDROCHLORIDE 20 MG/ML
INJECTION, SOLUTION INFILTRATION; PERINEURAL PRN
Status: DISCONTINUED | OUTPATIENT
Start: 2024-03-27 | End: 2024-03-27

## 2024-03-27 RX ORDER — OXYCODONE HYDROCHLORIDE 5 MG/1
5 TABLET ORAL
Status: DISCONTINUED | OUTPATIENT
Start: 2024-03-27 | End: 2024-03-28 | Stop reason: HOSPADM

## 2024-03-27 RX ORDER — ACETAMINOPHEN 500 MG
500-1000 TABLET ORAL EVERY 6 HOURS PRN
Status: DISCONTINUED | OUTPATIENT
Start: 2024-03-27 | End: 2024-03-27

## 2024-03-27 RX ORDER — ROSUVASTATIN CALCIUM 20 MG/1
40 TABLET, COATED ORAL DAILY
Status: DISCONTINUED | OUTPATIENT
Start: 2024-03-28 | End: 2024-03-28 | Stop reason: HOSPADM

## 2024-03-27 RX ORDER — ONDANSETRON 2 MG/ML
4 INJECTION INTRAMUSCULAR; INTRAVENOUS EVERY 6 HOURS PRN
Status: DISCONTINUED | OUTPATIENT
Start: 2024-03-27 | End: 2024-03-28 | Stop reason: HOSPADM

## 2024-03-27 RX ORDER — DIPHENHYDRAMINE HCL 12.5MG/5ML
12.5 LIQUID (ML) ORAL EVERY 6 HOURS PRN
Status: DISCONTINUED | OUTPATIENT
Start: 2024-03-27 | End: 2024-03-28 | Stop reason: HOSPADM

## 2024-03-27 RX ORDER — TRANEXAMIC ACID 10 MG/ML
1 INJECTION, SOLUTION INTRAVENOUS ONCE
Status: COMPLETED | OUTPATIENT
Start: 2024-03-27 | End: 2024-03-27

## 2024-03-27 RX ORDER — LEVOTHYROXINE SODIUM 25 UG/1
25 TABLET ORAL
Status: DISCONTINUED | OUTPATIENT
Start: 2024-03-28 | End: 2024-03-28 | Stop reason: HOSPADM

## 2024-03-27 RX ORDER — AMOXICILLIN 250 MG
1-2 CAPSULE ORAL 2 TIMES DAILY
Qty: 30 TABLET | Refills: 0 | Status: SHIPPED | OUTPATIENT
Start: 2024-03-27 | End: 2024-03-28

## 2024-03-27 RX ORDER — NALOXONE HYDROCHLORIDE 0.4 MG/ML
0.2 INJECTION, SOLUTION INTRAMUSCULAR; INTRAVENOUS; SUBCUTANEOUS
Status: DISCONTINUED | OUTPATIENT
Start: 2024-03-27 | End: 2024-03-28 | Stop reason: HOSPADM

## 2024-03-27 RX ORDER — KETOROLAC TROMETHAMINE 15 MG/ML
15 INJECTION, SOLUTION INTRAMUSCULAR; INTRAVENOUS EVERY 6 HOURS PRN
Status: DISCONTINUED | OUTPATIENT
Start: 2024-03-27 | End: 2024-03-28 | Stop reason: HOSPADM

## 2024-03-27 RX ORDER — FENTANYL CITRATE 0.05 MG/ML
50 INJECTION, SOLUTION INTRAMUSCULAR; INTRAVENOUS EVERY 5 MIN PRN
Status: DISCONTINUED | OUTPATIENT
Start: 2024-03-27 | End: 2024-03-27 | Stop reason: HOSPADM

## 2024-03-27 RX ORDER — DEXAMETHASONE SODIUM PHOSPHATE 4 MG/ML
INJECTION, SOLUTION INTRA-ARTICULAR; INTRALESIONAL; INTRAMUSCULAR; INTRAVENOUS; SOFT TISSUE PRN
Status: DISCONTINUED | OUTPATIENT
Start: 2024-03-27 | End: 2024-03-27

## 2024-03-27 RX ORDER — ACETAMINOPHEN 325 MG/1
650 TABLET ORAL EVERY 4 HOURS PRN
Status: DISCONTINUED | OUTPATIENT
Start: 2024-03-30 | End: 2024-03-27

## 2024-03-27 RX ORDER — ONDANSETRON 4 MG/1
4 TABLET, ORALLY DISINTEGRATING ORAL EVERY 6 HOURS PRN
Status: DISCONTINUED | OUTPATIENT
Start: 2024-03-27 | End: 2024-03-28 | Stop reason: HOSPADM

## 2024-03-27 RX ORDER — NALOXONE HYDROCHLORIDE 0.4 MG/ML
0.4 INJECTION, SOLUTION INTRAMUSCULAR; INTRAVENOUS; SUBCUTANEOUS
Status: DISCONTINUED | OUTPATIENT
Start: 2024-03-27 | End: 2024-03-28 | Stop reason: HOSPADM

## 2024-03-27 RX ORDER — ACETAMINOPHEN 325 MG/1
975 TABLET ORAL EVERY 8 HOURS
Status: DISCONTINUED | OUTPATIENT
Start: 2024-03-27 | End: 2024-03-28 | Stop reason: HOSPADM

## 2024-03-27 RX ORDER — LIDOCAINE 40 MG/G
CREAM TOPICAL
Status: DISCONTINUED | OUTPATIENT
Start: 2024-03-27 | End: 2024-03-28 | Stop reason: HOSPADM

## 2024-03-27 RX ORDER — CEFAZOLIN SODIUM/WATER 2 G/20 ML
2 SYRINGE (ML) INTRAVENOUS SEE ADMIN INSTRUCTIONS
Status: DISCONTINUED | OUTPATIENT
Start: 2024-03-27 | End: 2024-03-27 | Stop reason: HOSPADM

## 2024-03-27 RX ORDER — POLYETHYLENE GLYCOL 3350 17 G/17G
17 POWDER, FOR SOLUTION ORAL DAILY
Status: DISCONTINUED | OUTPATIENT
Start: 2024-03-28 | End: 2024-03-28 | Stop reason: HOSPADM

## 2024-03-27 RX ORDER — NALOXONE HYDROCHLORIDE 0.4 MG/ML
0.1 INJECTION, SOLUTION INTRAMUSCULAR; INTRAVENOUS; SUBCUTANEOUS
Status: DISCONTINUED | OUTPATIENT
Start: 2024-03-27 | End: 2024-03-27 | Stop reason: HOSPADM

## 2024-03-27 RX ORDER — FENTANYL CITRATE 0.05 MG/ML
25 INJECTION, SOLUTION INTRAMUSCULAR; INTRAVENOUS EVERY 5 MIN PRN
Status: DISCONTINUED | OUTPATIENT
Start: 2024-03-27 | End: 2024-03-27 | Stop reason: HOSPADM

## 2024-03-27 RX ORDER — BISACODYL 10 MG
10 SUPPOSITORY, RECTAL RECTAL DAILY PRN
Status: DISCONTINUED | OUTPATIENT
Start: 2024-03-27 | End: 2024-03-28 | Stop reason: HOSPADM

## 2024-03-27 RX ORDER — ONDANSETRON 4 MG/1
4 TABLET, ORALLY DISINTEGRATING ORAL EVERY 30 MIN PRN
Status: DISCONTINUED | OUTPATIENT
Start: 2024-03-27 | End: 2024-03-27 | Stop reason: HOSPADM

## 2024-03-27 RX ORDER — SODIUM CHLORIDE, SODIUM LACTATE, POTASSIUM CHLORIDE, CALCIUM CHLORIDE 600; 310; 30; 20 MG/100ML; MG/100ML; MG/100ML; MG/100ML
INJECTION, SOLUTION INTRAVENOUS CONTINUOUS
Status: DISCONTINUED | OUTPATIENT
Start: 2024-03-27 | End: 2024-03-28 | Stop reason: HOSPADM

## 2024-03-27 RX ORDER — CEFAZOLIN SODIUM/WATER 2 G/20 ML
2 SYRINGE (ML) INTRAVENOUS EVERY 8 HOURS
Qty: 40 ML | Refills: 0 | Status: DISCONTINUED | OUTPATIENT
Start: 2024-03-27 | End: 2024-03-27

## 2024-03-27 RX ORDER — FENTANYL CITRATE 50 UG/ML
INJECTION, SOLUTION INTRAMUSCULAR; INTRAVENOUS PRN
Status: DISCONTINUED | OUTPATIENT
Start: 2024-03-27 | End: 2024-03-27

## 2024-03-27 RX ORDER — CEFAZOLIN SODIUM 2 G/100ML
2 INJECTION, SOLUTION INTRAVENOUS EVERY 8 HOURS
Status: COMPLETED | OUTPATIENT
Start: 2024-03-27 | End: 2024-03-28

## 2024-03-27 RX ORDER — CALCIUM CARBONATE/VITAMIN D3 500 MG-10
TABLET ORAL DAILY
Status: DISCONTINUED | OUTPATIENT
Start: 2024-03-27 | End: 2024-03-27

## 2024-03-27 RX ORDER — AMOXICILLIN 250 MG
1 CAPSULE ORAL 2 TIMES DAILY
Status: DISCONTINUED | OUTPATIENT
Start: 2024-03-27 | End: 2024-03-28 | Stop reason: HOSPADM

## 2024-03-27 RX ADMIN — PHENYLEPHRINE HYDROCHLORIDE 0.3 MCG/KG/MIN: 10 INJECTION INTRAVENOUS at 12:21

## 2024-03-27 RX ADMIN — FENTANYL CITRATE 25 MCG: 50 INJECTION, SOLUTION INTRAMUSCULAR; INTRAVENOUS at 15:10

## 2024-03-27 RX ADMIN — Medication 2 G: at 12:21

## 2024-03-27 RX ADMIN — ONDANSETRON 4 MG: 2 INJECTION INTRAMUSCULAR; INTRAVENOUS at 13:50

## 2024-03-27 RX ADMIN — LIDOCAINE HYDROCHLORIDE 100 MG: 20 INJECTION, SOLUTION INFILTRATION; PERINEURAL at 12:12

## 2024-03-27 RX ADMIN — CEFAZOLIN SODIUM 2 G: 2 INJECTION, SOLUTION INTRAVENOUS at 21:27

## 2024-03-27 RX ADMIN — Medication 20 MG: at 13:33

## 2024-03-27 RX ADMIN — FENTANYL CITRATE 50 MCG: 50 INJECTION INTRAMUSCULAR; INTRAVENOUS at 12:12

## 2024-03-27 RX ADMIN — FENTANYL CITRATE 50 MCG: 50 INJECTION INTRAMUSCULAR; INTRAVENOUS at 12:42

## 2024-03-27 RX ADMIN — DEXAMETHASONE SODIUM PHOSPHATE 10 MG: 4 INJECTION, SOLUTION INTRA-ARTICULAR; INTRALESIONAL; INTRAMUSCULAR; INTRAVENOUS; SOFT TISSUE at 12:24

## 2024-03-27 RX ADMIN — KETOROLAC TROMETHAMINE 15 MG: 15 INJECTION, SOLUTION INTRAMUSCULAR; INTRAVENOUS at 14:51

## 2024-03-27 RX ADMIN — ACETAMINOPHEN 975 MG: 325 TABLET, FILM COATED ORAL at 17:35

## 2024-03-27 RX ADMIN — MIDAZOLAM 2 MG: 1 INJECTION INTRAMUSCULAR; INTRAVENOUS at 12:04

## 2024-03-27 RX ADMIN — HYDROMORPHONE HYDROCHLORIDE 0.5 MG: 1 INJECTION, SOLUTION INTRAMUSCULAR; INTRAVENOUS; SUBCUTANEOUS at 12:12

## 2024-03-27 RX ADMIN — HYDROMORPHONE HYDROCHLORIDE 0.5 MG: 1 INJECTION, SOLUTION INTRAMUSCULAR; INTRAVENOUS; SUBCUTANEOUS at 13:56

## 2024-03-27 RX ADMIN — FENTANYL CITRATE 25 MCG: 50 INJECTION, SOLUTION INTRAMUSCULAR; INTRAVENOUS at 14:53

## 2024-03-27 RX ADMIN — TRANEXAMIC ACID 1 G: 10 INJECTION, SOLUTION INTRAVENOUS at 13:49

## 2024-03-27 RX ADMIN — ASPIRIN 81 MG: 81 TABLET, COATED ORAL at 17:35

## 2024-03-27 RX ADMIN — PROPOFOL 150 MG: 10 INJECTION, EMULSION INTRAVENOUS at 12:12

## 2024-03-27 RX ADMIN — TRANEXAMIC ACID 1 G: 10 INJECTION, SOLUTION INTRAVENOUS at 12:16

## 2024-03-27 RX ADMIN — SODIUM CHLORIDE, POTASSIUM CHLORIDE, SODIUM LACTATE AND CALCIUM CHLORIDE: 600; 310; 30; 20 INJECTION, SOLUTION INTRAVENOUS at 17:37

## 2024-03-27 RX ADMIN — PROPOFOL 25 MCG/KG/MIN: 10 INJECTION, EMULSION INTRAVENOUS at 12:21

## 2024-03-27 RX ADMIN — SODIUM CHLORIDE, POTASSIUM CHLORIDE, SODIUM LACTATE AND CALCIUM CHLORIDE: 600; 310; 30; 20 INJECTION, SOLUTION INTRAVENOUS at 10:42

## 2024-03-27 RX ADMIN — PROPOFOL 50 MG: 10 INJECTION, EMULSION INTRAVENOUS at 12:47

## 2024-03-27 ASSESSMENT — LIFESTYLE VARIABLES: TOBACCO_USE: 1

## 2024-03-27 ASSESSMENT — ACTIVITIES OF DAILY LIVING (ADL)
ADLS_ACUITY_SCORE: 22
ADLS_ACUITY_SCORE: 33
ADLS_ACUITY_SCORE: 20
ADLS_ACUITY_SCORE: 20
ADLS_ACUITY_SCORE: 22
ADLS_ACUITY_SCORE: 20
ADLS_ACUITY_SCORE: 22
ADLS_ACUITY_SCORE: 20
ADLS_ACUITY_SCORE: 26
ADLS_ACUITY_SCORE: 20

## 2024-03-27 NOTE — ANESTHESIA POSTPROCEDURE EVALUATION
Patient: Cheryl Ramesh    Procedure: Procedure(s):  LEFT REVERSE TOTAL SHOULDER ARTHROPLASTY       Anesthesia Type:  General    Note:  Disposition: Inpatient   Postop Pain Control: Uneventful            Sign Out: Well controlled pain   PONV: No   Neuro/Psych: Uneventful            Sign Out: Acceptable/Baseline neuro status   Airway/Respiratory: Uneventful            Sign Out: Acceptable/Baseline resp. status   CV/Hemodynamics: Uneventful            Sign Out: Acceptable CV status   Other NRE: NONE   DID A NON-ROUTINE EVENT OCCUR?            Last vitals:  Vitals Value Taken Time   /116 03/27/24 1500   Temp 36.3  C (97.4  F) 03/27/24 1416   Pulse 90 03/27/24 1506   Resp 7 03/27/24 1506   SpO2 99 % 03/27/24 1506   Vitals shown include unfiled device data.    Electronically Signed By: Koby Short MD  March 27, 2024  3:08 PM

## 2024-03-27 NOTE — PLAN OF CARE
Goal Outcome Evaluation:    Summary: LEFT REVERSE TOTAL SHOULDER ARTHROPLASTY  POD0  DATE & TIME: Shift 3/27/2024, [id number], pt on unit @ 1600    Cognitive Concerns/ Orientation : A&O x4, calm & cooperative  BEHAVIOR & AGGRESSION TOOL COLOR: Green  CIWA SCORE: N/A  ABNL VS/O2: /82, otherwise VSS on 2L O2 NC (for comfort)  MOBILITY: SBA/Assist x1   PAIN MANAGMENT: 2/10, well-managed with scheduled Tylenol   DIET: Regular diet with good intake  BOWEL/BLADDER: Cont. B/B.  Purewick in place with good urinary output.  No BM this shift  ABNL LAB/BG:   DRAIN/DEVICES: PIV infusing LR @75mL/hr  SKIN: intact x shoulder incision  D/C DATE: 3/28/2024

## 2024-03-27 NOTE — PROGRESS NOTES
S: Pt. was not seen. Rafael COMBS. RX: Ultrasling 3 left. DX: Shoulder surgery.  O:A: Today I delivered an Ultrasling 3 left size medium to the Pre-OP pool room.  P: Pt. to be seen as needed.    [address], [place]

## 2024-03-27 NOTE — OP NOTE
PREOPERATIVE DIAGNOSES:   1.  Left glenohumeral osteoarthrosis, advanced, with small full thickness rotator cuff tear.     POSTOPERATIVE DIAGNOSES:     1.  Left glenohumeral osteoarthrosis, advanced, with small full thickness rotator cuff tear.     PROCEDURES:   1.  Left reverse total shoulder arthroplasty.  2.  Left long head biceps tenodesis.     SURGEON:  Ko Lucio MD      ASSISTANT:  Kaushik Hall PA-C      ANESTHESIA:  General endotracheal.      ESTIMATED BLOOD LOSS:  100 mL.      FLUID REPLACEMENT:  Two liters crystalloid.      COMPLICATIONS:  No immediate complications.      INDICATIONS:  Please see preoperative clinical notes.      EXAMINATION OF LEFT SHOULDER UNDER ANESTHESIA FINDINGS:  Passive range of motion 125/70/50/50/45.      COMPONENTS UTILIZED:   1.  Tornier Ascend Flex humeral component, size 3B, secured in roughly 20 degrees retroversion with cementless fixation.   2.  Standard low offset tray with maximal offset posterior and slightly inferior.   3.  A 6 mm polyethylene insert.   4.  Standard 25 mm baseplate.  5.  39 mm standard glenosphere component.      SCREW CONFIGURATION:   1.  Superior locking screw, 29 mm.   2.  Inferior locking screw, 29 mm.      DESCRIPTION OF PROCEDURE:  The patient was identified in the preoperative holding area and taken to room #34 of the main OR.  Monitors were placed per the Anesthesia Service.  After smooth IV induction, general anesthesia was introduced and her endotracheal tube secured.  She was given 2 g Ancef IV.  She was then repositioned in the modified beach chair position with the head and neck secured in neutral position and all peripheral extremities thoroughly padded with foam.  Left upper extremity was widely prepped and draped in the usual sterile fashion.  NATHANIEL hose and pneumoboots were in place and operational during the procedure.      Surgical team took timeout to confirm the correct patient, correct site marking, correct  equipment and implants, correct images were available, and that she had been administered IV antibiotic therapy.      An oblique skin incision was centered over the anterior left shoulder.  Skin flaps elevated.  Cephalic vein identified, retracted laterally with the deltoid, the pectoralis and conjoined tendons, swept medially in successive layers without excessive tension on the musculocutaneous nerve.      The subscapularis was tenotomized en bloc with the anterior capsule and elevated medially, while protecting the medial neurovascular bundle.  There was a small full thickness supraspinatus tear. The long head of the biceps was tenotomized and the intra-articular portion sharply excised.  The shoulder was then gently dislocated.  Marginal osteophytes which were significant were removed circumferentially around the periphery of the humeral head.  An anatomic neck cut was then performed, and favored a slightly inferior position due to reverse implant placement.  Reaming and broaching was then undertaken up to a size 3B trial, which had excellent fit and no instability.  A low offset tray had appropriate coverage posteroinferior and was accepted in that setting.      The glenoid was then circumferentially exposed.  A complete labrectomy/capsulectomy was performed anterior and posterior to allow full visualization.  The central starting point for reaming was undertaken and this was revisited with sequential reamers, as well as the high speed bur.  Thorough waterpik irrigation was utilized to remove all bony debris peripherally.  The opening drill was then utilized and after thorough irrigation, we then impacted the baseplate to appropriate press-fit with excellent fixation and backed this up with superior and inferior locking screws, and although the glenoid vault was too shallow to allow other screw fixation, excellent fixation was noted.  Thorough irrigation was then performed.  After thorough lavage, the  glenosphere was impacted and had excellent press-fit and then backed up with a central locking screw.      We then repeated trialing on the humeral side and a 6 mm insert that had appropriate soft tissue tension without rocking or instability was accepted.  The humeral trials were then removed.  Thorough irrigation was then performed.  The stem was impacted to appropriate press-fit with excellent security.  The trunnion was pulse-lavaged and suctioned dry, and the tray was then impacted in the chosen setting.  Following this, the polyethylene insert was snap fit and impacted with excellent fixation.  The shoulder was then reduced, found to have excellent soft tissue tension, no signs of rocking or instability throughout a full range of motion was accepted.      The arm was then placed in the 30/30 position.  After thorough irrigation, we closed the subscapularis and anterior capsule back to the lateral soft tissues, as well as the lesser tuberosity with several figure-of-eight #2 Ethibond sutures, incorporating the long head biceps for soft tissue tenodesis.  The leading edge of the supraspinatus remnant was then oversewn to the subscapularis as well, closing the rotator interval loosely to cover the implant.  Excellent overall closure and fixation was noted.     The axillary nerve was palpated and found to be intact.  After thorough irrigation, we then closed the deltopectoral interval with #2 Ethibond suture.  The deltoid and pectoralis musculature, as well as the subcutaneous tissue planes were then infiltrated with a total of 30 mL of 0.5% ropivacaine plain, 1 mg Duramorph and 15 mg Toradol.  The skin was closed in layers with 3-0 Monocryl and 3-0 Prolene running subcuticular for skin.  Steri-Strips and a bulky sterile dressing were applied.  She was then placed into an EZ wrap device and UltraSling in neutral position, was then reversed, extubated and taken back to the recovery room in stable condition.  There  were no immediate complications and she appeared to tolerate the procedure well.      A skilled first assistant was necessary for this procedure for assistance with patient positioning, prepping, draping, surgical visualization, implantation of the prosthesis, wound closure, dressing and sling application.      PQRI MEASURES:   1.  The patient was given 2 g Ancef IV within 1 hour prior to skin incision.  IV antibiotic therapy was discontinued within 24 hours postoperatively.   2.  Deep venous thrombosis prophylaxis will be with xarelto x 2 weeks due to her prior peripheral vascular history.  3.  UltraSling x 6 weeks for protection.   4.  Standard phase 1 reverse TSA protocol for physical therapy.   5.  She should have a true AP and outlet radiograph of the left shoulder at return to office in 10 days for suture removal.

## 2024-03-27 NOTE — ANESTHESIA CARE TRANSFER NOTE
Patient: Cheryl Ramesh    Procedure: Procedure(s):  LEFT REVERSE TOTAL SHOULDER ARTHROPLASTY       Diagnosis: Arthritis of left shoulder region [M19.012]  Rotator cuff tear [M75.100]  Diagnosis Additional Information: No value filed.    Anesthesia Type:   General     Note:    Oropharynx: oropharynx clear of all foreign objects  Level of Consciousness: awake  Oxygen Supplementation: face mask  Level of Supplemental Oxygen (L/min / FiO2): 6  Independent Airway: airway patency satisfactory and stable  Dentition: dentition unchanged      Patient transferred to: PACU    Handoff Report: Identifed the Patient, Identified the Reponsible Provider, Reviewed the pertinent medical history, Discussed the surgical course, Reviewed Intra-OP anesthesia mangement and issues during anesthesia, Set expectations for post-procedure period and Allowed opportunity for questions and acknowledgement of understanding      Vitals:  Vitals Value Taken Time   BP     Temp     Pulse     Resp     SpO2         Electronically Signed By: ZENAIDA Aiken CRNA  March 27, 2024  2:14 PM

## 2024-03-27 NOTE — ANESTHESIA PROCEDURE NOTES
Airway       Patient location during procedure: OR  Staff -        CRNA: Ani Ramey APRN CRNA       Performed By: CRNA  Consent for Airway        Urgency: elective  Indications and Patient Condition       Indications for airway management: rashaun-procedural       Induction type:intravenous       Mask difficulty assessment: 0 - not attempted    Final Airway Details       Final airway type: supraglottic airway    Supraglottic Airway Details        Type: LMA       Brand: Ambu AuraGain       LMA size: 4    Post intubation assessment        Placement verified by: capnometry, equal breath sounds and chest rise        Number of attempts at approach: 1       Secured with: tape       Ease of procedure: easy       Dentition: Intact and Unchanged

## 2024-03-27 NOTE — ANESTHESIA PREPROCEDURE EVALUATION
Anesthesia Pre-Procedure Evaluation    Patient: Cheryl Ramesh   MRN: [id number] : 1950        Procedure : Procedure(s):  LEFT REVERSE TOTAL SHOULDER ARTHROPLASTY          Past Medical History:   Diagnosis Date    Age-related osteoporosis with current pathological fracture     Benign neoplasm of colon     Depression     mild    Goiter     Hyperlipidemia     Hypothyroidism     Left leg pain     Osteoarthritis of glenohumeral joint, left     PAD (peripheral artery disease) (H24)     Tobacco abuse     Uterine fibroid       Past Surgical History:   Procedure Laterality Date    breast biopsy Right     IR LOWER EXTREMITY ANGIOGRAM LEFT  2021    IR LOWER EXTREMITY ANGIOGRAM LEFT  2021    mid left sfa to below knee popliteal bypass      FAILED    sling procedure      h/o urinary incontinence    TONSILLECTOMY      TOTAL KNEE ARTHROPLASTY Right 2020      Allergies   Allergen Reactions    Sertraline Nausea and Diarrhea    Azithromycin Rash     Cheryl doesn't recall having a problem with Zithromax. She only recalls Sulfa as being an allergy.    Ciprofloxacin Rash    Sulfa Antibiotics Rash      Social History     Tobacco Use    Smoking status: Every Day     Packs/day: .5     Types: Cigarettes     Last attempt to quit: 3/31/2021     Years since quittin.9    Smokeless tobacco: Never   Substance Use Topics    Alcohol use: Yes     Comment: once every 2 weeks      Wt Readings from Last 1 Encounters:   24 82.7 kg (182 lb 4.8 oz)        Anesthesia Evaluation   Pt has had prior anesthetic.     No history of anesthetic complications       ROS/MED HX  ENT/Pulmonary:     (+)                tobacco use,                        Neurologic:    (-) no CVA and no TIA   Cardiovascular:     (+) Dyslipidemia - Peripheral Vascular Disease-   -  - -                                      METS/Exercise Tolerance: >4 METS    Hematologic:  - neg hematologic  ROS     Musculoskeletal:   (+)  arthritis,            "  GI/Hepatic:    (-) GERD   Renal/Genitourinary:    (-) renal disease   Endo:     (+)          thyroid problem, hypothyroidism,    Obesity,    (-) Type II DM   Psychiatric/Substance Use:     (+) psychiatric history depression       Infectious Disease:  - neg infectious disease ROS     Malignancy:  - neg malignancy ROS     Other:            Physical Exam    Airway        Mallampati: II   TM distance: > 3 FB   Neck ROM: full   Mouth opening: > 3 cm    Respiratory Devices and Support         Dental       (+) Modest Abnormalities - crowns, retainers, 1 or 2 missing teeth      Cardiovascular   cardiovascular exam normal          Pulmonary   pulmonary exam normal                OUTSIDE LABS:  CBC: No results found for: \"WBC\", \"HGB\", \"HCT\", \"PLT\"  BMP: No results found for: \"NA\", \"POTASSIUM\", \"CHLORIDE\", \"CO2\", \"BUN\", \"CR\", \"GLC\"  COAGS: No results found for: \"PTT\", \"INR\", \"FIBR\"  POC: No results found for: \"BGM\", \"HCG\", \"HCGS\"  HEPATIC: No results found for: \"ALBUMIN\", \"PROTTOTAL\", \"ALT\", \"AST\", \"GGT\", \"ALKPHOS\", \"BILITOTAL\", \"BILIDIRECT\", \"TAMMY\"  OTHER: No results found for: \"PH\", \"LACT\", \"A1C\", \"KRISTY\", \"PHOS\", \"MAG\", \"LIPASE\", \"AMYLASE\", \"TSH\", \"T4\", \"T3\", \"CRP\", \"SED\"    Anesthesia Plan    ASA Status:  2    NPO Status:  NPO Appropriate    Anesthesia Type: General.     - Airway: LMA   Induction: Propofol.   Maintenance: Balanced.        Consents    Anesthesia Plan(s) and associated risks, benefits, and realistic alternatives discussed. Questions answered and patient/representative(s) expressed understanding.     - Discussed:     - Discussed with:  Patient            Postoperative Care    Pain management: IV analgesics.   PONV prophylaxis: Ondansetron (or other 5HT-3), Background Propofol Infusion     Comments:               Koby Short MD    I have reviewed the pertinent notes and labs in the chart from the past 30 days and (re)examined the patient.  Any updates or changes from those notes are reflected in this " "note.             # Drug Induced Platelet Defect: home medication list includes an antiplatelet medication  # Obesity: Estimated body mass index is 30.81 kg/m  as calculated from the following:    Height as of this encounter: 1.638 m (5' 4.5\").    Weight as of this encounter: 82.7 kg (182 lb 4.8 oz).      "

## 2024-03-28 ENCOUNTER — APPOINTMENT (OUTPATIENT)
Dept: OCCUPATIONAL THERAPY | Facility: CLINIC | Age: 74
End: 2024-03-28
Attending: ORTHOPAEDIC SURGERY
Payer: COMMERCIAL

## 2024-03-28 VITALS
DIASTOLIC BLOOD PRESSURE: 63 MMHG | HEART RATE: 89 BPM | HEIGHT: 65 IN | RESPIRATION RATE: 16 BRPM | SYSTOLIC BLOOD PRESSURE: 127 MMHG | BODY MASS INDEX: 30.37 KG/M2 | WEIGHT: 182.3 LBS | OXYGEN SATURATION: 98 % | TEMPERATURE: 98.1 F

## 2024-03-28 LAB
CREAT SERPL-MCNC: 0.61 MG/DL (ref 0.51–0.95)
EGFRCR SERPLBLD CKD-EPI 2021: >90 ML/MIN/1.73M2
FASTING STATUS PATIENT QL REPORTED: NO
GLUCOSE SERPL-MCNC: 120 MG/DL (ref 70–99)
HGB BLD-MCNC: 13.3 G/DL (ref 11.7–15.7)

## 2024-03-28 PROCEDURE — 36415 COLL VENOUS BLD VENIPUNCTURE: CPT | Performed by: ORTHOPAEDIC SURGERY

## 2024-03-28 PROCEDURE — 82947 ASSAY GLUCOSE BLOOD QUANT: CPT | Performed by: ORTHOPAEDIC SURGERY

## 2024-03-28 PROCEDURE — 97535 SELF CARE MNGMENT TRAINING: CPT | Mod: GO

## 2024-03-28 PROCEDURE — 250N000011 HC RX IP 250 OP 636: Performed by: ORTHOPAEDIC SURGERY

## 2024-03-28 PROCEDURE — 97165 OT EVAL LOW COMPLEX 30 MIN: CPT | Mod: GO

## 2024-03-28 PROCEDURE — 85018 HEMOGLOBIN: CPT | Performed by: ORTHOPAEDIC SURGERY

## 2024-03-28 PROCEDURE — 97110 THERAPEUTIC EXERCISES: CPT | Mod: GO

## 2024-03-28 PROCEDURE — 250N000013 HC RX MED GY IP 250 OP 250 PS 637: Performed by: ORTHOPAEDIC SURGERY

## 2024-03-28 PROCEDURE — 82565 ASSAY OF CREATININE: CPT | Performed by: ORTHOPAEDIC SURGERY

## 2024-03-28 RX ADMIN — LEVOTHYROXINE SODIUM 25 MCG: 25 TABLET ORAL at 07:18

## 2024-03-28 RX ADMIN — BUPROPION HYDROCHLORIDE 75 MG: 75 TABLET, FILM COATED ORAL at 10:19

## 2024-03-28 RX ADMIN — OXYCODONE HYDROCHLORIDE 10 MG: 5 TABLET ORAL at 06:12

## 2024-03-28 RX ADMIN — LORATADINE 10 MG: 10 TABLET ORAL at 10:18

## 2024-03-28 RX ADMIN — ASPIRIN 81 MG: 81 TABLET, COATED ORAL at 10:16

## 2024-03-28 RX ADMIN — RIVAROXABAN 10 MG: 10 TABLET, FILM COATED ORAL at 10:19

## 2024-03-28 RX ADMIN — ACETAMINOPHEN 975 MG: 325 TABLET, FILM COATED ORAL at 00:44

## 2024-03-28 RX ADMIN — CEFAZOLIN SODIUM 2 G: 2 INJECTION, SOLUTION INTRAVENOUS at 04:09

## 2024-03-28 RX ADMIN — OXYCODONE HYDROCHLORIDE 10 MG: 5 TABLET ORAL at 12:25

## 2024-03-28 RX ADMIN — ACETAMINOPHEN 975 MG: 325 TABLET, FILM COATED ORAL at 10:19

## 2024-03-28 RX ADMIN — TOLTERODINE TARTRATE 4 MG: 4 CAPSULE, EXTENDED RELEASE ORAL at 10:16

## 2024-03-28 RX ADMIN — ROSUVASTATIN CALCIUM 40 MG: 20 TABLET, FILM COATED ORAL at 10:16

## 2024-03-28 ASSESSMENT — ACTIVITIES OF DAILY LIVING (ADL)
ADLS_ACUITY_SCORE: 26
ADLS_ACUITY_SCORE: 22
ADLS_ACUITY_SCORE: 26
ADLS_ACUITY_SCORE: 22
DEPENDENT_IADLS:: INDEPENDENT
ADLS_ACUITY_SCORE: 26
PREVIOUS_RESPONSIBILITIES: MEAL PREP;LAUNDRY
ADLS_ACUITY_SCORE: 26
ADLS_ACUITY_SCORE: 26

## 2024-03-28 NOTE — PROGRESS NOTES
Diagnosis: Left Reverse Total Shoulder   POD#:0  Mental Status: A&Ox4  Activity/dangle Assist 1/SBA   Diet:Reg  Pain: scheduled tylenol  Spivey/Voiding: purewick in place  Tele/Restraints/Iso: NA  02/LDA: 2L NC for comfort, IV infusing LR  D/C Date:TBD  Other Info: Dressing CDI, CMS intact, hx urinary frequency/urgency

## 2024-03-28 NOTE — PROGRESS NOTES
POD #1  S: S/P left reverse total shoulder arthroplasty. Minimal to moderate pain, controlled with oxycodone and vistaril. Tolerating medications well. No nausea/vomiting/diarrhea. No fever/chills reported. Has not started PT/OT. Compliant with sling. Considering in home physical therapy/occupational therapy and would like a Social Work consult.     O: B/P: 142/74, T: 98.1, P: 89, R: 16     Alert, seated. NAD.  Left UE: Dressing clean/dry/intact. Deltoid/elbow & wrist motor intact. Distally motor and sensory intact.   B/L LE: Fatou's negative B/L.      X-ray: Postop xrays of left shoulder demonstrate good position of the reverse total shoulder implants with no loosening, lysis or interval complications evident.       S/P Left Reverse Total Shoulder Arthroplasty    Plan: Start PT/OT per standard reverse TSA protocol.  Wear sling. Change dressing to medipore dressing. Keep new dressing clean/dry/intact. Sponge bathe only. Continue pain mgmt with oxycodone,etc. Plan discharge home today. DVT prophylaxis with Xarelto x 2 weeks then resume home aspirin. Social Work consult and consider referral for in home therapy. Follow up with Dr. Lucio in clinic in 10-14 days.

## 2024-03-28 NOTE — PLAN OF CARE
Occupational Therapy Discharge Summary    Reason for therapy discharge:    All goals and outcomes met, no further needs identified.    Progress towards therapy goal(s). See goals on Care Plan in [facility] electronic health record for goal details.  Goals met    Therapy recommendation(s):    Defer to ortho. Anticipate at discharge pt will require distant supervision with showering, daily checkins, assist with strenous IADLs (cooking, cleaning, laundry, etc). Pt confirms this level of assist can be provided and confirms family can stay overnight tonight. Pt reports she is interested in home therapy

## 2024-03-28 NOTE — CONSULTS
Care Management Initial Consult    General Information  Assessment completed with: Patient,    Type of CM/SW Visit: Initial Assessment    Primary Care Provider verified and updated as needed: Yes   Readmission within the last 30 days:           Advance Care Planning:            Communication Assessment  Patient's communication style: spoken language (English or Bilingual)    Hearing Difficulty or Deaf: no   Wear Glasses or Blind: no    Cognitive  Cognitive/Neuro/Behavioral: WDL                      Living Environment:   People in home: alone     Current living Arrangements: house      Able to return to prior arrangements: yes       Family/Social Support:  Care provided by: self  Provides care for: no one  Marital Status: Single  Children          Description of Support System: Supportive, Involved         Current Resources:   Patient receiving home care services:       Community Resources:    Equipment currently used at home: cane, straight  Supplies currently used at home:      Employment/Financial:  Employment Status: retired        Financial Concerns:             Does the patient's insurance plan have a 3 day qualifying hospital stay waiver?  No    Lifestyle & Psychosocial Needs:  Social Determinants of Health     Food Insecurity: Not on file   Depression: Not on file   Housing Stability: Not on file   Tobacco Use: High Risk (3/27/2024)    Patient History     Smoking Tobacco Use: Every Day     Smokeless Tobacco Use: Never     Passive Exposure: Not on file   Financial Resource Strain: Not on file   Alcohol Use: Not on file   Transportation Needs: Not on file   Physical Activity: Not on file   Interpersonal Safety: Not on file   Stress: Not on file   Social Connections: Not on file       Functional Status:  Prior to admission patient needed assistance:   Dependent ADLs:: Independent  Dependent IADLs:: Independent       Mental Health Status:  Mental Health Status: No Current Concerns       Chemical Dependency  Status:                Values/Beliefs:  Spiritual, Cultural Beliefs, Gnosticist Practices, Values that affect care:                 Additional Information:  Discharge plans in progress.  Patient admitted for scheduled shoulder surgery and writer made aware that she will need home PT and OT.  Writer entered consult for discharge planning.  Spoke with patient via phone and confirmed her address, discussed [facility] PT and OT.  Patient stated she is not to start until after her follow up with dr eric.  Writer called Jeff Hall and  she may start in a week or so.  Order updated.  Writer LVM for [facility] regarding the above/     Bridget Lauren RN

## 2024-03-28 NOTE — PROGRESS NOTES
03/28/24 0859   Appointment Info   Signing Clinician's Name / Credentials (OT) Gela Browning, MS, OTR/L   Quick Adds   Quick Adds Certification   Living Environment   People in Home alone   Current Living Arrangements condominium   Living Environment Comments Reports son and grandson will assist intermittently at discharge   Self-Care   Usual Activity Tolerance good   Current Activity Tolerance moderate   Equipment Currently Used at Home cane, straight  (reacher. step in shower. comfort height toilet. long handled bath brush. shower chair)   Fall history within last six months no   Activity/Exercise/Self-Care Comment Pt reports at baseline she is independent in self-cares. Uses cane PRN due to plantar fascitis   Instrumental Activities of Daily Living (IADL)   Previous Responsibilities meal prep;laundry   General Information   Onset of Illness/Injury or Date of Surgery 03/27/24   Referring Physician Ko Lucio MD   Patient/Family Therapy Goal Statement (OT) Return home. Reports interest in home care   Additional Occupational Profile Info/Pertinent History of Current Problem POD1 LEFT REVERSE TOTAL SHOULDER ARTHROPLASTY. See EMR for PMH   Existing Precautions/Restrictions shoulder  (LUE sling. Per therapy order- DELTOID ISOMETRICS, ELBOW AND WRIST ROM. NO Codmans)   Cognitive Status Examination   Affect/Mental Status (Cognitive) WFL   Follows Commands follows multi-step commands   Visual Perception   Visual Impairment/Limitations corrective lenses for reading   Sensory   Sensory Comments Pt reports numbness in LUE post op   Pain Assessment   Patient Currently in Pain Yes, see Vital Sign flowsheet   Strength Comprehensive (MMT)   Comment, General Manual Muscle Testing (MMT) Assessment L hand dominant. WFL movement in RUE. 3/5 MMT in LUE hand, wrist. 2/5 MMT in LUE elbow.   Transfers   Transfers toilet transfer   Toilet Transfer   [place] Level (Toilet Transfer) modified independence   Activities  of Daily Living   BADL Assessment/Intervention lower body dressing;upper body dressing   Upper Body Dressing Assessment/Training   [place] Level (Upper Body Dressing) maximum assist (25% patient effort)   Lower Body Dressing Assessment/Training   [place] Level (Lower Body Dressing) modified independence   Clinical Impression   Criteria for Skilled Therapeutic Interventions Met (OT) Yes, treatment indicated   OT Diagnosis Decline function   OT Problem List-Impairments impacting ADL activity tolerance impaired;pain;post-surgical precautions   Assessment of Occupational Performance 5 or more Performance Deficits   Identified Performance Deficits UB dressing, bathing, functional mobility, strenuous IADLs   Planned Therapy Interventions (OT) ADL retraining;IADL retraining;home program guidelines;progressive activity/exercise;transfer training;ROM;strengthening   Clinical Decision Making Complexity (OT) problem focused assessment/low complexity   Risk & Benefits of therapy have been explained evaluation/treatment results reviewed;care plan/treatment goals reviewed;risks/benefits reviewed;current/potential barriers reviewed;participants voiced agreement with care plan;participants included;patient   OT Total Evaluation Time   OT Eval, Low Complexity Minutes (01616) 7   Therapy Certification   Medical Diagnosis Decline function post reverse TSA   Start of Care Date 03/28/24   Certification date from 03/28/24   Certification date to 03/29/24   OT Goals   Therapy Frequency (OT) One time eval and treatment   OT Predicted Duration/Target Date for Goal Attainment 03/29/24   OT Goals Upper Body Dressing;Transfers;OT Goal 1;OT Goal 2;OT Goal 3   OT: Upper Body Dressing Supervision/stand-by assist;within precautions   OT: Transfer   (Pt will navigate at least 150 ft with no more than SBA in order to demonstrate sufficient activity tolerance for safe discharge home)   OT: Goal 1 Pt will demonstrate independence in LUE  exercises per surgeon protocol to maintain LUE strength in anticipation of future functional use   OT: Goal 2 Pt will verbalize understanding of pain management strategies, how to progress activity tolerance, fall prevention   Interventions   Interventions Quick Adds Self-Care/Home Management;Therapeutic Procedures/Exercise   Self-Care/Home Management   Self-Care/Home Mgmt/ADL, Compensatory, Meal Prep Minutes (46845) 29   Symptoms Noted During/After Treatment (Meal Preparation/Planning Training) fatigue;increased pain   Treatment Detail/Skilled Intervention Upon arrival pt semi-supine in bed and agreeable to therapy. Pt participated in education regarding pain management strategies, fall prevention, how to progress activity tolerance. Pt donned tank top and doffed/donned sling with SBA and cues for technique. Educated at length regarding proper fit of sling and management of straps. Pt ambulated 150 ft in garcia with SBA using cane. Pt ambulated within room independently. Collborated with RN that pt can be independent in room and bathroom   Therapeutic Procedures/Exercise   Therapeutic Procedure: strength, endurance, ROM, flexibillity minutes (58542) 10   Symptoms Noted During/After Treatment increased pain   Treatment Detail/Skilled Intervention Pt engaged in 15 reps of the following LUE motions within protocol: digit flexion/extension, supination/pronation, wrist flexion/extension, radial/ulnar deviation, AAROM elbow flexion/extension. Pt participated in education and demonstration regarding 3 isometric deltoid exercises. Pt issued exercise instruction handout and participated in education regarding intensity, frequency, duration   OT Discharge Planning   OT Plan DC   OT Rationale for DC Rec Defer to ortho. Anticipate at discharge pt will require distant supervision with showering, daily checkins, assist with strenous IADLs (cooking, cleaning, laundry, etc). Pt confirms this level of assist can be provided and  confirms family can stay overnight tonight. Pt reports she is interested in home therapy   OT Brief overview of current status SBA dressing, toileting. Appears to have good understanding of exercise program and education   Total Session Time   Timed Code Treatment Minutes 39   Total Session Time (sum of timed and untimed services) 46      [facility]  OUTPATIENT OCCUPATIONAL THERAPY  EVALUATION  PLAN OF TREATMENT FOR OUTPATIENT REHABILITATION  (COMPLETE FOR INITIAL CLAIMS ONLY)  Patient's Last Name, First Name, M.I.  YOB: 1950  Cheryl Ramesh                          Provider's Name  [facility] Medical Record No.  [id number]                             Onset Date:  03/27/24   Start of Care Date:  03/28/24   Type:     ___PT   _X_OT   ___SLP Medical Diagnosis:  Decline function post reverse TSA                    OT Diagnosis:  Decline function Visits from SOC:  1     See note for plan of treatment, functional goals and certification details    I CERTIFY THE NEED FOR THESE SERVICES FURNISHED UNDER        THIS PLAN OF TREATMENT AND WHILE UNDER MY CARE     (Physician co-signature of this document indicates review and certification of the therapy plan).

## 2024-03-28 NOTE — CONSULTS
Care Management Initial Consult    General Information  Assessment completed with: Patient,    Type of CM/SW Visit: Initial Assessment    Primary Care Provider verified and updated as needed: Yes   Readmission within the last 30 days:           Advance Care Planning:            Communication Assessment  Patient's communication style: spoken language (English or Bilingual)    Hearing Difficulty or Deaf: no   Wear Glasses or Blind: no    Cognitive  Cognitive/Neuro/Behavioral: WDL                      Living Environment:   People in home: alone     Current living Arrangements: house      Able to return to prior arrangements: yes       Family/Social Support:  Care provided by: self  Provides care for: no one  Marital Status: Single  Children          Description of Support System: Supportive, Involved         Current Resources:   Patient receiving home care services:       Community Resources:    Equipment currently used at home: cane, straight  Supplies currently used at home:      Employment/Financial:  Employment Status: retired        Financial Concerns:             Does the patient's insurance plan have a 3 day qualifying hospital stay waiver?  No    Lifestyle & Psychosocial Needs:  Social Determinants of Health     Food Insecurity: Not on file   Depression: Not on file   Housing Stability: Not on file   Tobacco Use: High Risk (3/27/2024)    Patient History     Smoking Tobacco Use: Every Day     Smokeless Tobacco Use: Never     Passive Exposure: Not on file   Financial Resource Strain: Not on file   Alcohol Use: Not on file   Transportation Needs: Not on file   Physical Activity: Not on file   Interpersonal Safety: Not on file   Stress: Not on file   Social Connections: Not on file       Functional Status:  Prior to admission patient needed assistance:   Dependent ADLs:: Independent  Dependent IADLs:: Independent       Mental Health Status:  Mental Health Status: No Current Concerns       Chemical Dependency  Status:                Values/Beliefs:  Spiritual, Cultural Beliefs, Mosque Practices, Values that affect care:                 Additional Information:  Writer met with patient via phone call to her room to introduce self and plan home with [facility] PT and  OT.  Patient is agreeable and requested the SOC be after her follow up ortho appt.  Writer called AMALIA Mata who did state she could start innn 5 to 10 days.  Writer edited the orders accordingly and bedside RN with convey this to patient.  Writer let [facility] know via phone call-MARCELINA Lauren RN

## 2024-04-10 ENCOUNTER — THERAPY VISIT (OUTPATIENT)
Dept: PHYSICAL THERAPY | Facility: CLINIC | Age: 74
End: 2024-04-10
Payer: COMMERCIAL

## 2024-04-10 DIAGNOSIS — G89.29 CHRONIC LEFT SHOULDER PAIN: ICD-10-CM

## 2024-04-10 DIAGNOSIS — Z96.612 S/P REVERSE TOTAL SHOULDER ARTHROPLASTY, LEFT: Primary | ICD-10-CM

## 2024-04-10 DIAGNOSIS — M25.512 CHRONIC LEFT SHOULDER PAIN: ICD-10-CM

## 2024-04-10 PROCEDURE — 97110 THERAPEUTIC EXERCISES: CPT | Mod: 59

## 2024-04-10 PROCEDURE — 97530 THERAPEUTIC ACTIVITIES: CPT | Mod: GP

## 2024-04-10 NOTE — PROGRESS NOTES
"PHYSICAL THERAPY EVALUATION  Type of Visit: Evaluation    See electronic medical record for Abuse and Falls Screening details.    Subjective       Presenting condition or subjective complaint:  left shoulder surgery - reverse total shoulder replacement. DOS 3/27/24 with Dr. Ko Lucio at [facility].   Pt presents to OP PT- 2.5 weeks s/p above procedure. Overall doing very well. Pain control is pretty good, has been taking Tylenol only now and hasn't felt the need to ice much. Is wearing the sling full time except at quiet rest (at post-op visit, was told she could rest it on a pillow if sitting).       Date of onset: 03/27/24    Relevant medical history:   peripheral artery disease, arthritis, bladder problems, depression, hepatitis, incontinence, OA, overweight, pain at night, smoking, thyroid problems  Dates & types of surgery:  left leg arterial bypass surgery (x2, failed); right TKA     Prior diagnostic imaging/testing results:     x-ray, MRI  Prior therapy history for the same diagnosis, illness or injury:    no    Prior Level of Function  Transfers: Independent  Ambulation: Independent  ADL: Independent  IADL: Driving, Housekeeping, Laundry, Meal preparation, Medication management    Living Environment  Social support:   lives alone. Son lives nearby and has neighbor come over to help clean house  Type of home:     Stairs to enter the home:         Ramp:     Stairs inside the home:         Help at home:    Equipment owned:   cane, walker, bath bench, raised toilet seat    Employment:    retired  Hobbies/Interests:      Patient goals for therapy:  \"use my arm the way I used to\" - I'd like to use my arm without pain. I'd like to golf again.      Pain assessment:  dull ache at baseline. \"If I move it the wrong way it's a 10/10\"      Objective   -Deferred ROM assessment due to post-op precautions. Slight lack of elbow extension ROM.   -Incision clean, healing, no redness or drainage, steri-strips in place. Mild edema " surrounding shoulder and in hand.  -Sling fit appropriate, made minor adjustments  -Deltoid activation intact.   -Forward head/rounded shoulder posture.     Assessment & Plan   CLINICAL IMPRESSIONS  Medical Diagnosis: s/p left reverse TSA, LHB tenodesis    Treatment Diagnosis: s/p left reverse TSA, shoulder pain & mobility deficits   Impression/Assessment: Patient is a 74 year old female with left shoulder complaints s/p reverse TSA.  The following significant findings have been identified: Pain, Decreased ROM/flexibility, Decreased joint mobility, Decreased strength, Inflammation, Edema, Impaired muscle performance, and Decreased activity tolerance. These impairments interfere with their ability to perform self care tasks, recreational activities, household chores, and household mobility as compared to previous level of function.     Clinical Decision Making (Complexity):  Clinical Presentation: Stable/Uncomplicated  Clinical Presentation Rationale: based on medical and personal factors listed in PT evaluation  Clinical Decision Making (Complexity): Low complexity    PLAN OF CARE  Treatment Interventions:  Modalities: Cryotherapy  Interventions: Manual Therapy, Neuromuscular Re-education, Therapeutic Activity, Therapeutic Exercise, Self-Care/Home Management, Community/Work Reintegration    Long Term Goals     PT Goal 1  Goal Identifier: ROM  Goal Description: Pt will achieve full/functional passive or accive assisted ROM  Rationale:  (progression to independent ADLs within post-op precautions)  Target Date: 05/08/24  PT Goal 2  Goal Identifier: Reaching  Goal Description: Pt will achieve full available away from body active reaching  Rationale: to maximize safety and independence with performance of ADLs and functional tasks;to maximize safety and independence with self cares  Target Date: 06/19/24  PT Goal 3  Goal Identifier: Function  Goal Description: Pt will be independent with ADLs including lifting household  items & hair care.  Rationale: to maximize safety and independence with performance of ADLs and functional tasks;to maximize safety and independence within the home;to maximize safety and independence with self cares  Target Date: 07/17/24      Frequency of Treatment: 1x/week tapering to 2x/month  Duration of Treatment: 4 months    Recommended Referrals to Other Professionals: NA  Education Assessment:   Learner/Method: Patient    Risks and benefits of evaluation/treatment have been explained.   Patient/Family/caregiver agrees with Plan of Care.     Evaluation Time:           Signing Clinician: Whit Johnson PT      [facility]                                                                                   OUTPATIENT PHYSICAL THERAPY      PLAN OF TREATMENT FOR OUTPATIENT REHABILITATION   Patient's Last Name, First Name, Cheryl Powell YOB: 1950   Provider's Name   [facility]   Medical Record No.  [id number]     Onset Date: 03/27/24  Start of Care Date: 04/10/24     Medical Diagnosis:  s/p left reverse TSA, LHB tenodesis      PT Treatment Diagnosis:  s/p left reverse TSA, shoulder pain & mobility deficits Plan of Treatment  Frequency/Duration: 1x/week tapering to 2x/month/ 4 months    Certification date from 04/10/24 to 07/08/24         See note for plan of treatment details and functional goals     Whit Johnson PT                         I CERTIFY THE NEED FOR THESE SERVICES FURNISHED UNDER        THIS PLAN OF TREATMENT AND WHILE UNDER MY CARE .             Physician Signature               Date    X_____________________________________________________                  Referring Provider:  Ko Lucio    Initial Assessment  See Epic Evaluation- Start of Care Date: 04/10/24

## 2024-04-11 ENCOUNTER — TRANSCRIBE ORDERS (OUTPATIENT)
Dept: OTHER | Age: 74
End: 2024-04-11

## 2024-04-26 ENCOUNTER — TELEPHONE (OUTPATIENT)
Dept: OTHER | Facility: CLINIC | Age: 74
End: 2024-04-26
Payer: COMMERCIAL

## 2024-04-26 NOTE — TELEPHONE ENCOUNTER
"Pt has history of failed mid left SFA to BK popliteal bypass with NRTSV; s/p revision of distal anastomosis with thrombectomy. Both procedures done at outside hospital.     1/24/24 LOV with Dr. Holly, recommendation to follow up in one year.    Location? Left leg; lower leg usual numbness and tingling; whole leg  Swelling? Around right knee, \"pocket\" behind knee  Pain? Intermittent, feels \"ok\" today  Duration? increasedlLast couple of weeks.  Interventions tried? Taking Tylenol, and Aleve last night and feels better.    I spoke with patient.  We had a very similar discussion regarding the same concerns on 1/17/24.  She states the difference is now it's more her \"entire\" leg versus just her lower leg.  When ask how far she can walk, she reports she has to take about 20 steps and then she feels better.    We discussed that leg pain is worsened with activity if there is arterial compromise.  I offered to schedule her for a repeat YVON and to see our NP, however patient declined.  Recommended she contact [facility] and request to be seen by a provider for her knee pain.  Instructed to call back if she had any further questions or concerns.  Patient verbalized understanding.    Rabia Vo, KEVANN, RN, [facility] [place]    "

## 2024-04-26 NOTE — TELEPHONE ENCOUNTER
[facility]    Who is the name of the provider?:  BRENNA MEI   What is the location you see this provider at/preferred location?: [place]  Person calling / Facility: Cheryl Ramesh  Phone number:  [phone] (home)   Nurse call back needed:  yes     Reason for call:    Patient describes that her left leg is extremely painful when sitting or sleeping. It has been noticeable for a period of months,  but in the last 2 weeks it has been more painful.     Please call patient and advise next steps.    Pharmacy location:     [id number] - [address] [address]  Outside Imaging: n/a   Can we leave a detailed message on this number?  YES     4/26/2024, 9:50 AM

## 2024-05-01 ENCOUNTER — THERAPY VISIT (OUTPATIENT)
Dept: PHYSICAL THERAPY | Facility: CLINIC | Age: 74
End: 2024-05-01
Payer: COMMERCIAL

## 2024-05-01 DIAGNOSIS — Z96.612 S/P REVERSE TOTAL SHOULDER ARTHROPLASTY, LEFT: Primary | ICD-10-CM

## 2024-05-01 DIAGNOSIS — M25.512 CHRONIC LEFT SHOULDER PAIN: ICD-10-CM

## 2024-05-01 DIAGNOSIS — G89.29 CHRONIC LEFT SHOULDER PAIN: ICD-10-CM

## 2024-05-01 PROCEDURE — 97110 THERAPEUTIC EXERCISES: CPT | Mod: GP

## 2024-05-01 PROCEDURE — 97140 MANUAL THERAPY 1/> REGIONS: CPT | Mod: GP

## 2024-05-15 ENCOUNTER — THERAPY VISIT (OUTPATIENT)
Dept: PHYSICAL THERAPY | Facility: CLINIC | Age: 74
End: 2024-05-15
Payer: COMMERCIAL

## 2024-05-15 DIAGNOSIS — Z96.612 S/P REVERSE TOTAL SHOULDER ARTHROPLASTY, LEFT: Primary | ICD-10-CM

## 2024-05-15 DIAGNOSIS — G89.29 CHRONIC LEFT SHOULDER PAIN: ICD-10-CM

## 2024-05-15 DIAGNOSIS — M25.512 CHRONIC LEFT SHOULDER PAIN: ICD-10-CM

## 2024-05-15 PROCEDURE — 97140 MANUAL THERAPY 1/> REGIONS: CPT | Mod: GP

## 2024-05-15 PROCEDURE — 97110 THERAPEUTIC EXERCISES: CPT | Mod: GP

## 2024-05-23 ENCOUNTER — THERAPY VISIT (OUTPATIENT)
Dept: PHYSICAL THERAPY | Facility: CLINIC | Age: 74
End: 2024-05-23
Payer: COMMERCIAL

## 2024-05-23 DIAGNOSIS — M25.512 CHRONIC LEFT SHOULDER PAIN: ICD-10-CM

## 2024-05-23 DIAGNOSIS — G89.29 CHRONIC LEFT SHOULDER PAIN: ICD-10-CM

## 2024-05-23 DIAGNOSIS — Z96.612 S/P REVERSE TOTAL SHOULDER ARTHROPLASTY, LEFT: Primary | ICD-10-CM

## 2024-05-23 PROCEDURE — 97140 MANUAL THERAPY 1/> REGIONS: CPT | Mod: GP

## 2024-05-23 PROCEDURE — 97110 THERAPEUTIC EXERCISES: CPT | Mod: GP

## 2024-05-29 ENCOUNTER — THERAPY VISIT (OUTPATIENT)
Dept: PHYSICAL THERAPY | Facility: CLINIC | Age: 74
End: 2024-05-29
Payer: COMMERCIAL

## 2024-05-29 DIAGNOSIS — Z96.612 S/P REVERSE TOTAL SHOULDER ARTHROPLASTY, LEFT: Primary | ICD-10-CM

## 2024-05-29 DIAGNOSIS — M25.512 CHRONIC LEFT SHOULDER PAIN: ICD-10-CM

## 2024-05-29 DIAGNOSIS — G89.29 CHRONIC LEFT SHOULDER PAIN: ICD-10-CM

## 2024-05-29 PROCEDURE — 97110 THERAPEUTIC EXERCISES: CPT | Mod: GP

## 2024-05-29 PROCEDURE — 97140 MANUAL THERAPY 1/> REGIONS: CPT | Mod: GP

## 2024-06-05 ENCOUNTER — THERAPY VISIT (OUTPATIENT)
Dept: PHYSICAL THERAPY | Facility: CLINIC | Age: 74
End: 2024-06-05
Payer: COMMERCIAL

## 2024-06-05 DIAGNOSIS — G89.29 CHRONIC LEFT SHOULDER PAIN: ICD-10-CM

## 2024-06-05 DIAGNOSIS — Z96.612 S/P REVERSE TOTAL SHOULDER ARTHROPLASTY, LEFT: Primary | ICD-10-CM

## 2024-06-05 DIAGNOSIS — M25.512 CHRONIC LEFT SHOULDER PAIN: ICD-10-CM

## 2024-06-05 PROCEDURE — 97110 THERAPEUTIC EXERCISES: CPT | Mod: GP

## 2024-06-12 ENCOUNTER — THERAPY VISIT (OUTPATIENT)
Dept: PHYSICAL THERAPY | Facility: CLINIC | Age: 74
End: 2024-06-12
Payer: COMMERCIAL

## 2024-06-12 DIAGNOSIS — M25.512 CHRONIC LEFT SHOULDER PAIN: ICD-10-CM

## 2024-06-12 DIAGNOSIS — Z96.612 S/P REVERSE TOTAL SHOULDER ARTHROPLASTY, LEFT: Primary | ICD-10-CM

## 2024-06-12 DIAGNOSIS — G89.29 CHRONIC LEFT SHOULDER PAIN: ICD-10-CM

## 2024-06-12 PROCEDURE — 97140 MANUAL THERAPY 1/> REGIONS: CPT | Mod: GP

## 2024-06-12 PROCEDURE — 97110 THERAPEUTIC EXERCISES: CPT | Mod: GP

## 2024-06-19 ENCOUNTER — THERAPY VISIT (OUTPATIENT)
Dept: PHYSICAL THERAPY | Facility: CLINIC | Age: 74
End: 2024-06-19
Payer: COMMERCIAL

## 2024-06-19 DIAGNOSIS — G89.29 CHRONIC LEFT SHOULDER PAIN: ICD-10-CM

## 2024-06-19 DIAGNOSIS — M25.512 CHRONIC LEFT SHOULDER PAIN: ICD-10-CM

## 2024-06-19 DIAGNOSIS — Z96.612 S/P REVERSE TOTAL SHOULDER ARTHROPLASTY, LEFT: Primary | ICD-10-CM

## 2024-06-19 PROCEDURE — 97110 THERAPEUTIC EXERCISES: CPT | Mod: GP

## 2024-06-26 ENCOUNTER — THERAPY VISIT (OUTPATIENT)
Dept: PHYSICAL THERAPY | Facility: CLINIC | Age: 74
End: 2024-06-26
Payer: COMMERCIAL

## 2024-06-26 DIAGNOSIS — Z96.612 S/P REVERSE TOTAL SHOULDER ARTHROPLASTY, LEFT: Primary | ICD-10-CM

## 2024-06-26 DIAGNOSIS — G89.29 CHRONIC LEFT SHOULDER PAIN: ICD-10-CM

## 2024-06-26 DIAGNOSIS — M25.512 CHRONIC LEFT SHOULDER PAIN: ICD-10-CM

## 2024-06-26 PROCEDURE — 97110 THERAPEUTIC EXERCISES: CPT | Mod: GP

## 2024-07-10 ENCOUNTER — THERAPY VISIT (OUTPATIENT)
Dept: PHYSICAL THERAPY | Facility: CLINIC | Age: 74
End: 2024-07-10
Payer: COMMERCIAL

## 2024-07-10 DIAGNOSIS — M25.512 CHRONIC LEFT SHOULDER PAIN: ICD-10-CM

## 2024-07-10 DIAGNOSIS — Z96.612 S/P REVERSE TOTAL SHOULDER ARTHROPLASTY, LEFT: Primary | ICD-10-CM

## 2024-07-10 DIAGNOSIS — G89.29 CHRONIC LEFT SHOULDER PAIN: ICD-10-CM

## 2024-07-10 PROCEDURE — 97110 THERAPEUTIC EXERCISES: CPT | Mod: GP

## 2024-07-10 PROCEDURE — 97530 THERAPEUTIC ACTIVITIES: CPT | Mod: GP

## 2024-07-26 ENCOUNTER — THERAPY VISIT (OUTPATIENT)
Dept: PHYSICAL THERAPY | Facility: CLINIC | Age: 74
End: 2024-07-26
Payer: COMMERCIAL

## 2024-07-26 DIAGNOSIS — M25.512 CHRONIC LEFT SHOULDER PAIN: ICD-10-CM

## 2024-07-26 DIAGNOSIS — Z96.612 S/P REVERSE TOTAL SHOULDER ARTHROPLASTY, LEFT: Primary | ICD-10-CM

## 2024-07-26 DIAGNOSIS — G89.29 CHRONIC LEFT SHOULDER PAIN: ICD-10-CM

## 2024-07-26 PROCEDURE — 97110 THERAPEUTIC EXERCISES: CPT | Mod: GP

## 2024-12-05 ENCOUNTER — THERAPY VISIT (OUTPATIENT)
Dept: PHYSICAL THERAPY | Facility: CLINIC | Age: 74
End: 2024-12-05
Payer: COMMERCIAL

## 2024-12-05 DIAGNOSIS — M17.12 PRIMARY OSTEOARTHRITIS OF LEFT KNEE: Primary | ICD-10-CM

## 2024-12-05 ASSESSMENT — ACTIVITIES OF DAILY LIVING (ADL)
PLEASE_INDICATE_YOR_PRIMARY_REASON_FOR_REFERRAL_TO_THERAPY:: KNEE
PAIN: THE SYMPTOM AFFECTS MY ACTIVITY MODERATELY
HOW_WOULD_YOU_RATE_THE_OVERALL_FUNCTION_OF_YOUR_KNEE_DURING_YOUR_USUAL_DAILY_ACTIVITIES?: ABNORMAL
AS_A_RESULT_OF_YOUR_KNEE_INJURY,_HOW_WOULD_YOU_RATE_YOUR_CURRENT_LEVEL_OF_DAILY_ACTIVITY?: ABNORMAL
PAIN: THE SYMPTOM AFFECTS MY ACTIVITY MODERATELY
KNEE_ACTIVITY_OF_DAILY_LIVING_SUM: 2
HOW_WOULD_YOU_RATE_THE_OVERALL_FUNCTION_OF_YOUR_KNEE_DURING_YOUR_USUAL_DAILY_ACTIVITIES?: ABNORMAL
AS_A_RESULT_OF_YOUR_KNEE_INJURY,_HOW_WOULD_YOU_RATE_YOUR_CURRENT_LEVEL_OF_DAILY_ACTIVITY?: ABNORMAL
HOW_WOULD_YOU_RATE_THE_CURRENT_FUNCTION_OF_YOUR_KNEE_DURING_YOUR_USUAL_DAILY_ACTIVITIES_ON_A_SCALE_FROM_0_TO_100_WITH_100_BEING_YOUR_LEVEL_OF_KNEE_FUNCTION_PRIOR_TO_YOUR_INJURY_AND_0_BEING_THE_INABILITY_TO_PERFORM_ANY_OF_YOUR_USUAL_DAILY_ACTIVITIES?: 50
HOW_WOULD_YOU_RATE_THE_CURRENT_FUNCTION_OF_YOUR_KNEE_DURING_YOUR_USUAL_DAILY_ACTIVITIES_ON_A_SCALE_FROM_0_TO_100_WITH_100_BEING_YOUR_LEVEL_OF_KNEE_FUNCTION_PRIOR_TO_YOUR_INJURY_AND_0_BEING_THE_INABILITY_TO_PERFORM_ANY_OF_YOUR_USUAL_DAILY_ACTIVITIES?: 50

## 2024-12-05 NOTE — PROGRESS NOTES
PHYSICAL THERAPY EVALUATION  Type of Visit: Evaluation              Subjective     Presents to PT to prepare for upcoming left knee replacement. Currently will use her cane to ambulate anything longer than about 1/2 block. No stairs at home - apartment with elevator access. Also having bilateral foot pain - feels like it's related to inactivity, history of plantar fasciitis. Has ongoing numbness & tingling due to poor circulation - history of PAD. Not much for usual exercise except previous shoulder HEP from joint replacement last spring.         Presenting condition or subjective complaint: (Patient-Rptd) LEFT knee pain and foot pain  Date of onset: 11/11/24 (date of orders)    Relevant medical history:   PAD, smoking, depression  Dates & types of surgery: (Patient-Rptd) 2020 right knee surgery  2021 2failed bypass surgeries in my left leg  2024 left shoulder surgery r-TSA    Prior diagnostic imaging/testing results: (Patient-Rptd) CT scan; X-ray     Prior therapy history for the same diagnosis, illness or injury: (Patient-Rptd) Yes (Patient-Rptd) when i had my right knee surgery    Prior Level of Function  Transfers: Independent  Ambulation: Independent, Assistive equipment  ADL: Independent  IADL: Driving, Housekeeping, Laundry, Meal preparation, Medication management    Living Environment  Social support: (Patient-Rptd) Alone   Type of home: (Patient-Rptd) Apartment/condo; 1 level   Stairs to enter the home: (Patient-Rptd) No       Ramp: (Patient-Rptd) No   Stairs inside the home: (Patient-Rptd) No       Help at home: (Patient-Rptd) None  Equipment owned: (Patient-Rptd) Four-point cane; Walker; Raised toilet seat; Bath bench     Employment: (Patient-Rptd) No    Hobbies/Interests: (Patient-Rptd) time with friends and family    Patient goals for therapy: (Patient-Rptd) review exercises that will help me after knee surgery     Objective   KNEE EVALUATION  INTEGUMENTARY (edema, incisions):  1+ pitting edema  throughout R LE; Baker's Cyst  +Stemmer sign  +varicosities and slight redness, flaky, hemosiderin in lower 1/3 of calf    GAIT:  Weightbearing Status:  FWB  Assistive Device(s): None  Gait Deviations: Antalgic    BALANCE/PROPRIOCEPTION: Single Leg Stance Eyes Open (seconds): 8 sec L, 5 sec R    ROM:   (Degrees) Left AROM Left PROM  Right AROM Right PROM   Knee Flexion 95 100 ++pain     Knee Extension -5 -5 slight pain     Pain:   End feel:     FLEXIBILITY:  Decreased gastroc L    FUNCTIONAL TESTS:  5x Sit to Stand: 14 sec     JOINT MOBILITY:  decreased L patella, tibiofemoral     Assessment & Plan   CLINICAL IMPRESSIONS  Medical Diagnosis: primary osteoarthritis of left knee    Treatment Diagnosis: left knee pain; left leg weakness   Impression/Assessment: Patient is a 74 year old female with left knee complaints.  The following significant findings have been identified: Pain, Decreased ROM/flexibility, Decreased joint mobility, Impaired balance, Decreased proprioception, Inflammation, Edema, and Impaired gait. These impairments interfere with their ability to perform self care tasks, recreational activities, household chores, household mobility, and community mobility as compared to previous level of function.     Clinical Decision Making (Complexity):  Clinical Presentation: Stable/Uncomplicated  Clinical Presentation Rationale: based on medical and personal factors listed in PT evaluation  Clinical Decision Making (Complexity): Low complexity    PLAN OF CARE  Treatment Interventions:  Interventions: Gait Training, Neuromuscular Re-education, Therapeutic Activity, Therapeutic Exercise, Self-Care/Home Management    Long Term Goals     PT Goal 1  Goal Identifier: HEP  Goal Description: pt will be independent with comprehensive HEP focusing on LE strength, balance, and mobility  Rationale: to maximize safety and independence with self cares;to maximize safety and independence with performance of ADLs and functional  tasks  Target Date: 01/07/24  PT Goal 2  Goal Identifier: Ambulation  Goal Description: pt will demonstrate safe ambulation using assistive device and safe stair navigation in preparation for left knee surgery  Rationale: to maximize safety and independence within the home  Target Date: 01/07/25      Frequency of Treatment: 1x/week  Duration of Treatment: 4 weeks    Recommended Referrals to Other Professionals: NA  Education Assessment:   Learner/Method: Patient    Risks and benefits of evaluation/treatment have been explained.   Patient/Family/caregiver agrees with Plan of Care.     Evaluation Time:     PT Eval, Low Complexity Minutes (44026): 15     Signing Clinician: RON Pham [facility]                                                                                   OUTPATIENT PHYSICAL THERAPY      PLAN OF TREATMENT FOR OUTPATIENT REHABILITATION   Patient's Last Name, First Name, Cheryl Powell YOB: 1950   Provider's Name   [facility]   Medical Record No.  [id number]     Onset Date: 11/11/24 (date of orders)  Start of Care Date: 12/05/24     Medical Diagnosis:  primary osteoarthritis of left knee      PT Treatment Diagnosis:  left knee pain; left leg weakness Plan of Treatment  Frequency/Duration: 1x/week/ 4 weeks    Certification date from 12/05/24 to 01/07/25         See note for plan of treatment details and functional goals     Whit Johnson PT                         I CERTIFY THE NEED FOR THESE SERVICES FURNISHED UNDER        THIS PLAN OF TREATMENT AND WHILE UNDER MY CARE .             Physician Signature               Date    X_____________________________________________________                  Referring Provider:  Edilberto Wolfe    Initial Assessment  See Epic Evaluation- Start of Care Date: 12/05/24

## 2024-12-12 ENCOUNTER — THERAPY VISIT (OUTPATIENT)
Dept: PHYSICAL THERAPY | Facility: CLINIC | Age: 74
End: 2024-12-12
Payer: COMMERCIAL

## 2024-12-12 DIAGNOSIS — M17.12 PRIMARY OSTEOARTHRITIS OF LEFT KNEE: Primary | ICD-10-CM

## 2025-02-10 ENCOUNTER — THERAPY VISIT (OUTPATIENT)
Dept: PHYSICAL THERAPY | Facility: CLINIC | Age: 75
End: 2025-02-10
Payer: COMMERCIAL

## 2025-02-10 DIAGNOSIS — M25.562 LEFT KNEE PAIN: ICD-10-CM

## 2025-02-10 DIAGNOSIS — Z96.652 S/P TOTAL KNEE ARTHROPLASTY, LEFT: Primary | ICD-10-CM

## 2025-02-10 PROCEDURE — 97110 THERAPEUTIC EXERCISES: CPT | Mod: GP

## 2025-02-10 PROCEDURE — 97140 MANUAL THERAPY 1/> REGIONS: CPT | Mod: GP

## 2025-02-10 PROCEDURE — 97161 PT EVAL LOW COMPLEX 20 MIN: CPT | Mod: GP

## 2025-02-10 ASSESSMENT — ACTIVITIES OF DAILY LIVING (ADL)
RISE FROM A CHAIR: ACTIVITY IS MINIMALLY DIFFICULT
PLEASE_INDICATE_YOR_PRIMARY_REASON_FOR_REFERRAL_TO_THERAPY:: KNEE
STAND: ACTIVITY IS MINIMALLY DIFFICULT
SIT WITH YOUR KNEE BENT: ACTIVITY IS MINIMALLY DIFFICULT
PAIN: THE SYMPTOM AFFECTS MY ACTIVITY MODERATELY
KNEE_ACTIVITY_OF_DAILY_LIVING_SUM: 30
SIT WITH YOUR KNEE BENT: ACTIVITY IS MINIMALLY DIFFICULT
HOW_WOULD_YOU_RATE_THE_CURRENT_FUNCTION_OF_YOUR_KNEE_DURING_YOUR_USUAL_DAILY_ACTIVITIES_ON_A_SCALE_FROM_0_TO_100_WITH_100_BEING_YOUR_LEVEL_OF_KNEE_FUNCTION_PRIOR_TO_YOUR_INJURY_AND_0_BEING_THE_INABILITY_TO_PERFORM_ANY_OF_YOUR_USUAL_DAILY_ACTIVITIES?: 50
SWELLING: THE SYMPTOM AFFECTS MY ACTIVITY MODERATELY
WALK: ACTIVITY IS MINIMALLY DIFFICULT
HOW_WOULD_YOU_RATE_THE_OVERALL_FUNCTION_OF_YOUR_KNEE_DURING_YOUR_USUAL_DAILY_ACTIVITIES?: ABNORMAL
SQUAT: ACTIVITY IS VERY DIFFICULT
GO UP STAIRS: ACTIVITY IS FAIRLY DIFFICULT
KNEEL ON THE FRONT OF YOUR KNEE: I AM UNABLE TO DO THE ACTIVITY
STAND: ACTIVITY IS MINIMALLY DIFFICULT
GO UP STAIRS: ACTIVITY IS FAIRLY DIFFICULT
KNEEL ON THE FRONT OF YOUR KNEE: I AM UNABLE TO DO THE ACTIVITY
RISE FROM A CHAIR: ACTIVITY IS MINIMALLY DIFFICULT
HOW_WOULD_YOU_RATE_THE_CURRENT_FUNCTION_OF_YOUR_KNEE_DURING_YOUR_USUAL_DAILY_ACTIVITIES_ON_A_SCALE_FROM_0_TO_100_WITH_100_BEING_YOUR_LEVEL_OF_KNEE_FUNCTION_PRIOR_TO_YOUR_INJURY_AND_0_BEING_THE_INABILITY_TO_PERFORM_ANY_OF_YOUR_USUAL_DAILY_ACTIVITIES?: 50
LIMPING: I HAVE THE SYMPTOM BUT IT DOES NOT AFFECT MY ACTIVITY
SQUAT: ACTIVITY IS VERY DIFFICULT
HOW_WOULD_YOU_RATE_THE_OVERALL_FUNCTION_OF_YOUR_KNEE_DURING_YOUR_USUAL_DAILY_ACTIVITIES?: ABNORMAL
LIMPING: I HAVE THE SYMPTOM BUT IT DOES NOT AFFECT MY ACTIVITY
SWELLING: THE SYMPTOM AFFECTS MY ACTIVITY MODERATELY
WEAKNESS: THE SYMPTOM AFFECTS MY ACTIVITY SLIGHTLY
WALK: ACTIVITY IS MINIMALLY DIFFICULT
WEAKNESS: THE SYMPTOM AFFECTS MY ACTIVITY SLIGHTLY
PAIN: THE SYMPTOM AFFECTS MY ACTIVITY MODERATELY

## 2025-02-10 NOTE — PROGRESS NOTES
"PHYSICAL THERAPY EVALUATION  Type of Visit: Evaluation              Subjective     Presents to OP PT 1 month after left TKA. Had home health PT 1x/week for 3 weeks. Doing well and ambulating with just the cane for the most part. Does have a lot of swelling and a known Baker's cyst, also history of PAD, so swelling management has been difficult. It is most painful at night when trying to sleep. Has been participating in exercises taught by  PT but fell off in the last few days.     PT orders from surgeon: \"Work on compression for swelling; quad and hamstring strengthening\"        Presenting condition or subjective complaint: left knee surgery - TKA  Date of onset: 01/07/25    Relevant medical history: Arthritis; Bladder or bowel problems   Dates & types of surgery:  right TKA, left r-TSA    Prior diagnostic imaging/testing results: MRI; CT scan; X-ray; Other       Prior therapy history for the same diagnosis, illness or injury:    yes    Prior Level of Function  Transfers: Independent  Ambulation: Independent, Assistive equipment  ADL: Independent  IADL: Driving, Finances, Housekeeping, Laundry, Meal preparation, Medication management    Living Environment  Social support: Alone   Type of home: Apartment/condo; 1 level   Stairs to enter the home: No       Ramp: No   Stairs inside the home: No       Help at home: Home and Yard maintenance tasks  Equipment owned: Straight Cane; Walker; Walker with wheels; Bath bench     Employment: No    Hobbies/Interests:      Patient goals for therapy: normal activity without pain    Pain assessment: Pain present  Location: global knee /Rating: 3/10     Objective   KNEE EVALUATION  PAIN: Pain Level at Rest: 2/10  Pain Level with Use: 8/10  Pain Location: anterior knee, posterior knee, calf  Pain Quality: tight, stiff, achy, cramping in calf  Pain Frequency: intermittent  Pain is Worst: nighttime  Pain is Exacerbated By: laying/sleeping,   Pain is Relieved By: cold  Pain " Progression: Improved    INTEGUMENTARY (edema, incisions):  incision CDI, healing nicely, nearly all closed.    1+ pitting edema throughout R LE; Baker's Cyst  +Stemmer sign  +varicosities and slight redness, flaky, hemosiderin in lower 1/3 of calf  (All present at baseline prior to surgery)    GAIT:  Weightbearing Status: WBAT  Assistive Device(s): Cane (single end)  Gait Deviations: WNL  Slight decreased knee extension on initial contact    ROM:   (Degrees) Right AROM Right PROM  Left AROM Left PROM   Knee Flexion 115  100 105   Knee Extension 0  -5 -3   Pain:   End feel:     STRENGTH:  able to complete SLR on L with ~5 deg lag       FUNCTIONAL TESTS: Double Leg Squat: Anterior knee translation, Improper use of glutes/hips, and able to perform shallow/modified squat with mild weight shift off surgical side      Assessment & Plan   CLINICAL IMPRESSIONS  Medical Diagnosis: status post total left knee replacement    Treatment Diagnosis: left knee pain, s/p TKA   Impression/Assessment: Patient is a 74 year old female with left knee complaints s/p TKA.  The following significant findings have been identified: Pain, Decreased ROM/flexibility, Decreased joint mobility, Decreased strength, Inflammation, Edema, Impaired gait, Impaired muscle performance, and Decreased activity tolerance. These impairments interfere with their ability to perform self care tasks, recreational activities, household chores, household mobility, and community mobility as compared to previous level of function.     Clinical Decision Making (Complexity):  Clinical Presentation: Stable/Uncomplicated  Clinical Presentation Rationale: based on medical and personal factors listed in PT evaluation  Clinical Decision Making (Complexity): Low complexity    PLAN OF CARE  Treatment Interventions:  Interventions: Gait Training, Manual Therapy, Neuromuscular Re-education, Therapeutic Activity, Therapeutic Exercise, Self-Care/Home Management    Long Term  Goals     PT Goal 1  Goal Identifier: Ambulation  Goal Description: pt will be able to ambulate with no AD, at least 10 minutes or 2 blocks, not limited by knee pain  Rationale: to maximize safety and independence with performance of ADLs and functional tasks;to maximize safety and independence within the community  Target Date: 03/24/25  PT Goal 2  Goal Identifier: Stairs  Goal Description: pt will be able to navigate a full flight of stairs in a normal reciprocal pattern  Rationale: to maximize safety and independence with performance of ADLs and functional tasks;to maximize safety and independence within the community  Target Date: 04/10/25      Frequency of Treatment: 1x/week  Duration of Treatment: 2 months    Recommended Referrals to Other Professionals:  lymphedema therapy  Education Assessment:   Learner/Method: Patient    Risks and benefits of evaluation/treatment have been explained.   Patient/Family/caregiver agrees with Plan of Care.     Evaluation Time:     PT Eval, Low Complexity Minutes (43778): 15   Signing Clinician: Whit Johnson PT        [facility]                                                                                   OUTPATIENT PHYSICAL THERAPY      PLAN OF TREATMENT FOR OUTPATIENT REHABILITATION   Patient's Last Name, First Name, Cheryl Powell YOB: 1950   Provider's Name   [facility]   Medical Record No.  [id number]     Onset Date: 01/07/25  Start of Care Date: 02/10/25     Medical Diagnosis:  status post total left knee replacement      PT Treatment Diagnosis:  left knee pain, s/p TKA Plan of Treatment  Frequency/Duration: 1x/week/ 2 months    Certification date from 02/10/25 to 04/10/25         See note for plan of treatment details and functional goals     Whit Johnson PT                         I CERTIFY THE NEED FOR THESE SERVICES FURNISHED UNDER        THIS PLAN OF TREATMENT AND WHILE  UNDER MY CARE .             Physician Signature               Date    X_____________________________________________________                  Referring Provider:  Edilberto Wolfe    Initial Assessment  See Epic Evaluation- Start of Care Date: 02/10/25

## 2025-02-12 ENCOUNTER — TRANSCRIBE ORDERS (OUTPATIENT)
Dept: OTHER | Age: 75
End: 2025-02-12

## 2025-02-12 DIAGNOSIS — M79.89 LEFT LEG SWELLING: Primary | ICD-10-CM

## 2025-02-17 ENCOUNTER — THERAPY VISIT (OUTPATIENT)
Dept: PHYSICAL THERAPY | Facility: CLINIC | Age: 75
End: 2025-02-17
Payer: COMMERCIAL

## 2025-02-17 DIAGNOSIS — Z96.652 S/P TOTAL KNEE ARTHROPLASTY, LEFT: ICD-10-CM

## 2025-02-17 DIAGNOSIS — M25.562 LEFT KNEE PAIN: Primary | ICD-10-CM

## 2025-02-17 PROCEDURE — 97140 MANUAL THERAPY 1/> REGIONS: CPT | Mod: GP

## 2025-02-17 PROCEDURE — 97110 THERAPEUTIC EXERCISES: CPT | Mod: GP

## 2025-02-24 ENCOUNTER — THERAPY VISIT (OUTPATIENT)
Dept: PHYSICAL THERAPY | Facility: CLINIC | Age: 75
End: 2025-02-24
Payer: COMMERCIAL

## 2025-02-24 DIAGNOSIS — Z96.652 S/P TOTAL KNEE ARTHROPLASTY, LEFT: Primary | ICD-10-CM

## 2025-02-24 DIAGNOSIS — M25.562 LEFT KNEE PAIN: ICD-10-CM

## 2025-02-24 PROCEDURE — 97112 NEUROMUSCULAR REEDUCATION: CPT | Mod: GP

## 2025-02-24 PROCEDURE — 97110 THERAPEUTIC EXERCISES: CPT | Mod: GP

## 2025-03-04 ENCOUNTER — THERAPY VISIT (OUTPATIENT)
Dept: PHYSICAL THERAPY | Facility: CLINIC | Age: 75
End: 2025-03-04
Payer: COMMERCIAL

## 2025-03-04 DIAGNOSIS — Z96.652 S/P TOTAL KNEE ARTHROPLASTY, LEFT: Primary | ICD-10-CM

## 2025-03-04 DIAGNOSIS — M25.562 LEFT KNEE PAIN: ICD-10-CM

## 2025-03-04 PROCEDURE — 97110 THERAPEUTIC EXERCISES: CPT | Mod: GP

## 2025-03-04 PROCEDURE — 97140 MANUAL THERAPY 1/> REGIONS: CPT | Mod: GP

## 2025-03-04 PROCEDURE — 97112 NEUROMUSCULAR REEDUCATION: CPT | Mod: GP

## 2025-03-17 ENCOUNTER — THERAPY VISIT (OUTPATIENT)
Age: 75
End: 2025-03-17
Payer: COMMERCIAL

## 2025-03-17 DIAGNOSIS — Z96.652 S/P TOTAL KNEE ARTHROPLASTY, LEFT: Primary | ICD-10-CM

## 2025-03-17 DIAGNOSIS — M25.562 LEFT KNEE PAIN: ICD-10-CM

## 2025-03-17 PROCEDURE — 97110 THERAPEUTIC EXERCISES: CPT | Mod: GP

## 2025-03-17 PROCEDURE — 97112 NEUROMUSCULAR REEDUCATION: CPT | Mod: GP

## 2025-03-31 ENCOUNTER — THERAPY VISIT (OUTPATIENT)
Age: 75
End: 2025-03-31
Payer: COMMERCIAL

## 2025-03-31 DIAGNOSIS — Z96.652 S/P TOTAL KNEE ARTHROPLASTY, LEFT: Primary | ICD-10-CM

## 2025-03-31 DIAGNOSIS — M25.562 LEFT KNEE PAIN: ICD-10-CM

## 2025-03-31 PROCEDURE — 97110 THERAPEUTIC EXERCISES: CPT | Mod: GP

## 2025-03-31 PROCEDURE — 97112 NEUROMUSCULAR REEDUCATION: CPT | Mod: GP

## 2025-03-31 PROCEDURE — 97530 THERAPEUTIC ACTIVITIES: CPT | Mod: GP

## 2025-03-31 NOTE — PROGRESS NOTES
[facility]                                                                                   OUTPATIENT PHYSICAL THERAPY    PLAN OF TREATMENT FOR OUTPATIENT REHABILITATION   Patient's Last Name, First Name, Cheryl Powell YOB: 1950   Provider's Name   [facility]   Medical Record No.  [id number]     Onset Date: 01/07/25  Start of Care Date: 02/10/25     Medical Diagnosis:  status post total left knee replacement      PT Treatment Diagnosis:  left knee pain, s/p TKA Plan of Treatment  Frequency/Duration: 2x/month/ up to 3 months    Certification date from 03/31/25 to 06/28/25         See note for plan of treatment details and functional goals     Whit Johnson PT                         I CERTIFY THE NEED FOR THESE SERVICES FURNISHED UNDER        THIS PLAN OF TREATMENT AND WHILE UNDER MY CARE .             Physician Signature               Date    X_____________________________________________________                  Referring Provider:  Edilberto Wolfe    Initial Assessment  See Epic Evaluation- Start of Care Date: 02/10/25        ASSESSMENT   3 months s/p left TKA overall doing well. Generally slow recovery due to history of PAD in the left lower extremity which leads to residual pain & swelling. She is improving in function and strength with basic ADLs but is still limited in longer community distance ambulation, balance, and floor transfers. Pt would continue to benefit from PT to address remaining impairments and promote full return to function.       PLAN  Continue therapy per current plan of care.  Decrease visit frequency to 2x/month.     Beginning/End Dates of Progress Note Reporting Period:  02/10/25 to 03/31/2025    Referring Provider:  Edilberto Wolfe     03/31/25 0500   Appointment Info   Signing clinician's name / credentials Whti Johnson PT DPT [facility]   Total/Authorized Visits [employer]   Visits Used 6   Medical  "Diagnosis status post total left knee replacement   PT Tx Diagnosis left knee pain, s/p TKA   Precautions/Limitations hx of PAD   Other pertinent information Dr. Wolfe at [facility]   Progress Note/Certification   Start of Care Date 02/10/25   Onset of illness/injury or Date of Surgery 01/07/25   Therapy Frequency 2x/month   Predicted Duration up to 3 months   Certification date from 03/31/25   Certification date to 06/28/25   Progress Note Completed Date 02/10/25   GOALS   PT Goals 2;3   PT Goal 1   Goal Identifier Ambulation   Goal Description pt will be able to ambulate with no AD, at least 10 minutes or 2 blocks, not limited by knee pain   Rationale to maximize safety and independence with performance of ADLs and functional tasks;to maximize safety and independence within the community   Goal Progress can walk as far as she needs to for chores/ADLs, using cart at grocery store, not pushing distance outside due to weather/foot pain/fatigue   Target Date 06/28/25   PT Goal 2   Goal Identifier Stairs   Goal Description pt will be able to navigate a full flight of stairs in a normal reciprocal pattern   Rationale to maximize safety and independence with performance of ADLs and functional tasks;to maximize safety and independence within the community   Goal Progress able to navigate reciprocally but \"it hurts\"   Target Date 04/10/25   Date Met 03/31/25   PT Goal 3   Goal Identifier Function   Goal Description Pt will be independent with ADLs around the house not limited by knee pain including floor transfers.   Target Date 06/28/25   Subjective Report   Subjective Report She reports doing well overall. Continuing with lymphedema therapy. The back of the knee continues to hurt. Overall my knee is better \"its a good thing I had surgery.\" I think the limitation is my PAD disease. Feeling fatigued at home she reports her lymphedema PT said this can happen with MLD.   Objective Measures   Objective Measures Objective Measure " 1;Objective Measure 2;Objective Measure 3;Objective Measure 4   Objective Measure 1   Objective Measure L knee ROM   Details 0-0-123 with overpressure, AROM flexion to 118   Objective Measure 2   Objective Measure SL balance on Left   Details 5.5 sec, challenging   Objective Measure 3   Details navigate stairs reciprocally with mod reliance on L UE during step up on Left.   Objective Measure 4   Objective Measure functional testing   Details TU.56 (avg 3 trials, no AD). 5x STS 14.16 seconds   Treatment Interventions (PT)   Interventions Therapeutic Procedure/Exercise;Neuromuscular Re-education;Therapeutic Activity   Therapeutic Procedure/Exercise   Therapeutic Procedures: strength, endurance, ROM, flexibility minutes (51908) 16   Therapeutic Procedures Ther Proc 2   Ther Proc 1 upright bike   Ther Proc 1 - Details x6 mins level 2 for knee ROM, SH 3.   Ther Proc 2 TE - cardio machines at home   Ther Proc 2 - Details discussed bike vs. elliptical, encouraged doing what feels safe & comfortable, but bike would have ROM benefits and be less impact down on the knee   PTRx Ther Proc 1 Stepdown Backward   PTRx Ther Proc 1 - Details VR - add a second set (doing 1x 10 at home)   PTRx Ther Proc 2 knee flexion stretch   PTRx Ther Proc 2 - Details with OP 3x 10 sec hold - inst in more end-range flexion stretching at home   PTRx Ther Proc 3 squat with chair behind   PTRx Ther Proc 3 - Details 2x 10 with slightly raised seat to cue depth. reviewed importance of challenging getting deeper   PTRx Ther Proc 4 TE   PTRx Ther Proc 4 - Details extra time to review & reinforce exercise dosage, positioning etc. for intent of strength vs. stretching   Skilled Intervention exercise cueing, reviewed dosage & rationale   Patient Response/Progress tolerates well   Therapeutic Activity   Therapeutic Activities: dynamic activities to improve functional performance minutes (14793) 16   Therapeutic Activities Ther Act 2;Ther Act 3   Ther Act  "1 phys performance testing   Ther Act 1 - Details time to conduct and review test with patient   Skilled Intervention education, testing for functional movement   Patient Response/Progress challenged with floor transfer   Ther Act 2 floor transfer   Ther Act 2 - Details demo & practice; discussed safety considrations for home.   Ther Act 3 scar massage   Ther Act 3 - Details self STM for hypomobile adhesions at anterior knee   Neuromuscular Re-education   Neuromuscular re-ed of mvmt, balance, coord, kinesthetic sense, posture, proprioception minutes (01724) 6   Neuromuscular Re-education Neuro Re-ed 2;Neuro Re-ed 3   Neuro Re-ed 1 NMR   Neuro Re-ed 1 - Details time to enforce safe completion at home, rationale for training dynamic balance   Neuro Re-ed 2 SL stance   Neuro Re-ed 2 - Details 4x to tolerance on LLE inst in progression for home   PTRx Neuro Re-ed 1 standing weight shift with cone toe taps   PTRx Neuro Re-ed 1 - Details 2x 90 sec alt legs onto 6\" cone   Skilled Intervention cueing guarding for safe proprio   Patient Response/Progress \"legs feel tired\" no knee pain   Education   Learner/Method Patient   Plan   Home program printed   Updates to plan of care focus on end-range KF and functional strength   Plan for next session work on end-range knee flexion & strength   Total Session Time   Timed Code Treatment Minutes 38   Total Treatment Time (sum of timed and untimed services) 38     "

## (undated) DEVICE — SPONGE RAY-TEC 4X8" 7318

## (undated) DEVICE — WRAP MCCONNELL ARM SUPPORT LG 12-401

## (undated) DEVICE — BONE CEMENT MIXEVAC HI VAC W/CARTRIDGE 0306-563-000

## (undated) DEVICE — PACK OPEN SHOULDER SOP15OCFSC

## (undated) DEVICE — ESU CLEANER TIP 31142717

## (undated) DEVICE — SPONGE PACK VAGINAL 2X36"

## (undated) DEVICE — Device

## (undated) DEVICE — DRSG ADAPTIC 3X8" 6113

## (undated) DEVICE — DRAPE IOBAN INCISE 23X17" 6650EZ

## (undated) DEVICE — NDL 19GA 1.5"

## (undated) DEVICE — ESU PENCIL W/SMOKE EVAC NEPTUNE STRYKER 0703-046-000

## (undated) DEVICE — SYR 03ML LL W/O NDL 309657

## (undated) DEVICE — DRSG TEGADERM 2 1/2X 2 3/4"

## (undated) DEVICE — SPONGE COTTONOID 1/4X3" 80-1398

## (undated) DEVICE — SU MONOCRYL 3-0 PS-2 27" Y427H

## (undated) DEVICE — MANIFOLD NEPTUNE 4 PORT 700-20

## (undated) DEVICE — PACK UNIVERSAL SPLIT 29131

## (undated) DEVICE — DRSG STERI STRIP 1/2X4" R1547

## (undated) DEVICE — PREP DURAPREP 26ML APL 8630

## (undated) DEVICE — GOWN IMPERVIOUS SPECIALTY XL/XLONG 39049

## (undated) DEVICE — SOL WATER IRRIG 1000ML BOTTLE 2F7114

## (undated) DEVICE — DRILL BIT TORNIER 3MM REVERSE STERILE DWD055

## (undated) DEVICE — ESU PENCIL W/HOLSTER E2350H

## (undated) DEVICE — SOL NACL 0.9% IRRIG 1000ML BOTTLE 2F7124

## (undated) DEVICE — LINEN TOWEL PACK X5 5464

## (undated) DEVICE — GLOVE BIOGEL PI ULTRATOUCH G SZ 8.5 42185

## (undated) DEVICE — BONE CEMENT MIXEVAC III HI VAC KIT  0206-015-000

## (undated) DEVICE — NDL 20GA 1.5"

## (undated) DEVICE — SU PROLENE 3-0 PS-2 18" 8687H

## (undated) DEVICE — DECANTER BAG 2002S

## (undated) DEVICE — GLOVE BIOGEL PI MICRO INDICATOR UNDERGLOVE SZ 8.5 48985

## (undated) DEVICE — SUCTION IRR SYSTEM W/O TIP INTERPULSE HANDPIECE 0210-100-000

## (undated) DEVICE — SU VICRYL 0 CT-1 CR 8X18" J740D

## (undated) DEVICE — DRAPE STERI U 1015

## (undated) DEVICE — DRSG KERLIX 4 1/2"X4YDS ROLL 6715

## (undated) DEVICE — SU ETHIBOND 2 V-37 4X30" MX69G

## (undated) DEVICE — BUR STRK ROUND 4.8X51.2MM FAST CUT 8 FLUTE 1608-006-139

## (undated) DEVICE — BLADE SAW SAGITTAL STRK 25X89.5X0.96MM 4/2000 2108-393-005

## (undated) DEVICE — ESU GROUND PAD UNIVERSAL W/O CORD

## (undated) DEVICE — DRAPE STERI TOWEL LG 1010

## (undated) DEVICE — SYR 50ML CATH TIP W/O NDL 309620

## (undated) DEVICE — SOL NACL 0.9% IRRIG 3000ML BAG 2B7477

## (undated) DEVICE — SOL NACL 0.9% INJ 250ML BAG 2B1322Q

## (undated) DEVICE — ESU ELEC NDL 1" E1552

## (undated) RX ORDER — MORPHINE SULFATE 1 MG/ML
INJECTION, SOLUTION EPIDURAL; INTRATHECAL; INTRAVENOUS
Status: DISPENSED
Start: 2024-03-27

## (undated) RX ORDER — DEXAMETHASONE SODIUM PHOSPHATE 4 MG/ML
INJECTION, SOLUTION INTRA-ARTICULAR; INTRALESIONAL; INTRAMUSCULAR; INTRAVENOUS; SOFT TISSUE
Status: DISPENSED
Start: 2024-03-27

## (undated) RX ORDER — ONDANSETRON 2 MG/ML
INJECTION INTRAMUSCULAR; INTRAVENOUS
Status: DISPENSED
Start: 2024-03-27

## (undated) RX ORDER — HYDROMORPHONE HYDROCHLORIDE 1 MG/ML
INJECTION, SOLUTION INTRAMUSCULAR; INTRAVENOUS; SUBCUTANEOUS
Status: DISPENSED
Start: 2024-03-27

## (undated) RX ORDER — KETOROLAC TROMETHAMINE 15 MG/ML
INJECTION, SOLUTION INTRAMUSCULAR; INTRAVENOUS
Status: DISPENSED
Start: 2024-03-27

## (undated) RX ORDER — FENTANYL CITRATE 0.05 MG/ML
INJECTION, SOLUTION INTRAMUSCULAR; INTRAVENOUS
Status: DISPENSED
Start: 2024-03-27

## (undated) RX ORDER — FENTANYL CITRATE 50 UG/ML
INJECTION, SOLUTION INTRAMUSCULAR; INTRAVENOUS
Status: DISPENSED
Start: 2024-03-27